# Patient Record
Sex: MALE | Race: WHITE
[De-identification: names, ages, dates, MRNs, and addresses within clinical notes are randomized per-mention and may not be internally consistent; named-entity substitution may affect disease eponyms.]

---

## 2017-08-05 ENCOUNTER — HOSPITAL ENCOUNTER (OUTPATIENT)
Dept: HOSPITAL 76 - RT | Age: 67
Discharge: HOME | End: 2017-08-05
Attending: FAMILY MEDICINE
Payer: MEDICARE

## 2017-08-05 DIAGNOSIS — J44.9: ICD-10-CM

## 2017-08-05 DIAGNOSIS — J45.909: Primary | ICD-10-CM

## 2017-08-05 PROCEDURE — 94729 DIFFUSING CAPACITY: CPT

## 2017-08-05 PROCEDURE — 94060 EVALUATION OF WHEEZING: CPT

## 2018-04-28 ENCOUNTER — HOSPITAL ENCOUNTER (OUTPATIENT)
Dept: HOSPITAL 76 - EMS | Age: 68
Discharge: ANOTHER HOSPITAL | End: 2018-04-28
Attending: SURGERY
Payer: MEDICARE

## 2018-04-28 ENCOUNTER — HOSPITAL ENCOUNTER (EMERGENCY)
Dept: HOSPITAL 76 - ED | Age: 68
Discharge: ANOTHER HOSPITAL | End: 2018-04-28
Payer: MEDICARE

## 2018-04-28 VITALS — DIASTOLIC BLOOD PRESSURE: 73 MMHG | SYSTOLIC BLOOD PRESSURE: 114 MMHG

## 2018-04-28 DIAGNOSIS — I10: ICD-10-CM

## 2018-04-28 DIAGNOSIS — K29.00: ICD-10-CM

## 2018-04-28 DIAGNOSIS — I21.4: Primary | ICD-10-CM

## 2018-04-28 DIAGNOSIS — R07.9: Primary | ICD-10-CM

## 2018-04-28 DIAGNOSIS — R07.2: ICD-10-CM

## 2018-04-28 DIAGNOSIS — J45.909: ICD-10-CM

## 2018-04-28 LAB
ALBUMIN DIAFP-MCNC: 4.7 G/DL (ref 3.2–5.5)
ALBUMIN/GLOB SERPL: 1.2 {RATIO} (ref 1–2.2)
ALP SERPL-CCNC: 102 IU/L (ref 42–121)
ALT SERPL W P-5'-P-CCNC: 34 IU/L (ref 10–60)
ANION GAP SERPL CALCULATED.4IONS-SCNC: 12 MMOL/L (ref 6–13)
AST SERPL W P-5'-P-CCNC: 44 IU/L (ref 10–42)
BASOPHILS NFR BLD AUTO: 0.2 10^3/UL (ref 0–0.1)
BASOPHILS NFR BLD AUTO: 2.5 %
BILIRUB BLD-MCNC: 1.6 MG/DL (ref 0.2–1)
BUN SERPL-MCNC: 9 MG/DL (ref 6–20)
CALCIUM UR-MCNC: 9.6 MG/DL (ref 8.5–10.3)
CHLORIDE SERPL-SCNC: 86 MMOL/L (ref 101–111)
CO2 SERPL-SCNC: 31 MMOL/L (ref 21–32)
CREAT SERPLBLD-SCNC: 0.9 MG/DL (ref 0.6–1.2)
EOSINOPHIL # BLD AUTO: 0 10^3/UL (ref 0–0.7)
EOSINOPHIL NFR BLD AUTO: 0.3 %
ERYTHROCYTE [DISTWIDTH] IN BLOOD BY AUTOMATED COUNT: 15.2 % (ref 12–15)
GFRSERPLBLD MDRD-ARVRAT: 84 ML/MIN/{1.73_M2} (ref 89–?)
GLOBULIN SER-MCNC: 3.8 G/DL (ref 2.1–4.2)
GLUCOSE SERPL-MCNC: 109 MG/DL (ref 70–100)
HGB UR QL STRIP: 16.9 G/DL (ref 14–18)
LIPASE SERPL-CCNC: 24 U/L (ref 22–51)
LYMPHOCYTES # SPEC AUTO: 1.2 10^3/UL (ref 1.5–3.5)
LYMPHOCYTES NFR BLD AUTO: 11.8 %
MAGNESIUM SERPL-MCNC: 2.1 MG/DL (ref 1.7–2.8)
MCH RBC QN AUTO: 37.5 PG (ref 27–31)
MCHC RBC AUTO-ENTMCNC: 34.5 G/DL (ref 32–36)
MCV RBC AUTO: 108.7 FL (ref 80–94)
MONOCYTES # BLD AUTO: 0.6 10^3/UL (ref 0–1)
MONOCYTES NFR BLD AUTO: 6 %
NEUTROPHILS # BLD AUTO: 7.8 10^3/UL (ref 1.5–6.6)
NEUTROPHILS # SNV AUTO: 9.8 X10^3/UL (ref 4.8–10.8)
NEUTROPHILS NFR BLD AUTO: 79.4 %
PDW BLD AUTO: 7 FL (ref 7.4–11.4)
PLATELET # BLD: 305 10^3/UL (ref 130–450)
PROT SPEC-MCNC: 8.5 G/DL (ref 6.7–8.2)
RBC MAR: 4.51 10^6/UL (ref 4.7–6.1)
SODIUM SERPLBLD-SCNC: 129 MMOL/L (ref 135–145)

## 2018-04-28 PROCEDURE — 36415 COLL VENOUS BLD VENIPUNCTURE: CPT

## 2018-04-28 PROCEDURE — 85025 COMPLETE CBC W/AUTO DIFF WBC: CPT

## 2018-04-28 PROCEDURE — 96365 THER/PROPH/DIAG IV INF INIT: CPT

## 2018-04-28 PROCEDURE — 93005 ELECTROCARDIOGRAM TRACING: CPT

## 2018-04-28 PROCEDURE — 80053 COMPREHEN METABOLIC PANEL: CPT

## 2018-04-28 PROCEDURE — 96376 TX/PRO/DX INJ SAME DRUG ADON: CPT

## 2018-04-28 PROCEDURE — 84484 ASSAY OF TROPONIN QUANT: CPT

## 2018-04-28 PROCEDURE — 83880 ASSAY OF NATRIURETIC PEPTIDE: CPT

## 2018-04-28 PROCEDURE — 83735 ASSAY OF MAGNESIUM: CPT

## 2018-04-28 PROCEDURE — 99284 EMERGENCY DEPT VISIT MOD MDM: CPT

## 2018-04-28 PROCEDURE — 80320 DRUG SCREEN QUANTALCOHOLS: CPT

## 2018-04-28 PROCEDURE — 83690 ASSAY OF LIPASE: CPT

## 2018-04-28 PROCEDURE — 96375 TX/PRO/DX INJ NEW DRUG ADDON: CPT

## 2018-04-28 PROCEDURE — 96366 THER/PROPH/DIAG IV INF ADDON: CPT

## 2018-04-28 PROCEDURE — 99285 EMERGENCY DEPT VISIT HI MDM: CPT

## 2018-04-28 PROCEDURE — 71046 X-RAY EXAM CHEST 2 VIEWS: CPT

## 2018-04-28 PROCEDURE — 96367 TX/PROPH/DG ADDL SEQ IV INF: CPT

## 2018-04-28 RX ADMIN — NITROGLYCERIN STA: 20 OINTMENT TOPICAL at 13:07

## 2018-04-28 RX ADMIN — NITROGLYCERIN STA INCH: 20 OINTMENT TOPICAL at 12:30

## 2018-06-12 ENCOUNTER — HOSPITAL ENCOUNTER (EMERGENCY)
Dept: HOSPITAL 76 - ED | Age: 68
Discharge: HOME | End: 2018-06-12
Payer: MEDICARE

## 2018-06-12 VITALS — SYSTOLIC BLOOD PRESSURE: 112 MMHG | DIASTOLIC BLOOD PRESSURE: 67 MMHG

## 2018-06-12 DIAGNOSIS — I10: ICD-10-CM

## 2018-06-12 DIAGNOSIS — I95.2: Primary | ICD-10-CM

## 2018-06-12 DIAGNOSIS — K70.9: ICD-10-CM

## 2018-06-12 DIAGNOSIS — Z79.82: ICD-10-CM

## 2018-06-12 LAB
ALBUMIN DIAFP-MCNC: 3.5 G/DL (ref 3.2–5.5)
ALBUMIN/GLOB SERPL: 1.2 {RATIO} (ref 1–2.2)
ALP SERPL-CCNC: 77 IU/L (ref 42–121)
ALT SERPL W P-5'-P-CCNC: 34 IU/L (ref 10–60)
ANION GAP SERPL CALCULATED.4IONS-SCNC: 7 MMOL/L (ref 6–13)
AST SERPL W P-5'-P-CCNC: 44 IU/L (ref 10–42)
BASOPHILS NFR BLD AUTO: 0 10^3/UL (ref 0–0.1)
BASOPHILS NFR BLD AUTO: 0.6 %
BILIRUB BLD-MCNC: 1.4 MG/DL (ref 0.2–1)
BUN SERPL-MCNC: 10 MG/DL (ref 6–20)
CALCIUM UR-MCNC: 9.1 MG/DL (ref 8.5–10.3)
CHLORIDE SERPL-SCNC: 91 MMOL/L (ref 101–111)
CO2 SERPL-SCNC: 28 MMOL/L (ref 21–32)
CREAT SERPLBLD-SCNC: 0.9 MG/DL (ref 0.6–1.2)
EOSINOPHIL # BLD AUTO: 0 10^3/UL (ref 0–0.7)
EOSINOPHIL NFR BLD AUTO: 0.6 %
ERYTHROCYTE [DISTWIDTH] IN BLOOD BY AUTOMATED COUNT: 16.1 % (ref 12–15)
GFRSERPLBLD MDRD-ARVRAT: 84 ML/MIN/{1.73_M2} (ref 89–?)
GLOBULIN SER-MCNC: 3 G/DL (ref 2.1–4.2)
GLUCOSE SERPL-MCNC: 100 MG/DL (ref 70–100)
HGB UR QL STRIP: 13.3 G/DL (ref 14–18)
LIPASE SERPL-CCNC: 31 U/L (ref 22–51)
LYMPHOCYTES # SPEC AUTO: 1.2 10^3/UL (ref 1.5–3.5)
LYMPHOCYTES NFR BLD AUTO: 16.5 %
MCH RBC QN AUTO: 38 PG (ref 27–31)
MCHC RBC AUTO-ENTMCNC: 34.3 G/DL (ref 32–36)
MCV RBC AUTO: 110.8 FL (ref 80–94)
MONOCYTES # BLD AUTO: 1.1 10^3/UL (ref 0–1)
MONOCYTES NFR BLD AUTO: 15.7 %
NEUTROPHILS # BLD AUTO: 4.9 10^3/UL (ref 1.5–6.6)
NEUTROPHILS # SNV AUTO: 7.3 X10^3/UL (ref 4.8–10.8)
NEUTROPHILS NFR BLD AUTO: 66.6 %
PDW BLD AUTO: 7.1 FL (ref 7.4–11.4)
PLAT MORPH BLD: (no result)
PLATELET # BLD: 311 10^3/UL (ref 130–450)
PLATELET BLD QL SMEAR: (no result)
PROT SPEC-MCNC: 6.5 G/DL (ref 6.7–8.2)
RBC MAR: 3.49 10^6/UL (ref 4.7–6.1)
RBC MORPH BLD: (no result)
SODIUM SERPLBLD-SCNC: 126 MMOL/L (ref 135–145)

## 2018-06-12 PROCEDURE — 99284 EMERGENCY DEPT VISIT MOD MDM: CPT

## 2018-06-12 PROCEDURE — 85025 COMPLETE CBC W/AUTO DIFF WBC: CPT

## 2018-06-12 PROCEDURE — 93005 ELECTROCARDIOGRAM TRACING: CPT

## 2018-06-12 PROCEDURE — 99283 EMERGENCY DEPT VISIT LOW MDM: CPT

## 2018-06-12 PROCEDURE — 36415 COLL VENOUS BLD VENIPUNCTURE: CPT

## 2018-06-12 PROCEDURE — 83690 ASSAY OF LIPASE: CPT

## 2018-06-12 PROCEDURE — 80053 COMPREHEN METABOLIC PANEL: CPT

## 2018-06-12 PROCEDURE — 96360 HYDRATION IV INFUSION INIT: CPT

## 2018-12-24 ENCOUNTER — HOSPITAL ENCOUNTER (EMERGENCY)
Dept: HOSPITAL 76 - ED | Age: 68
Discharge: HOME | End: 2018-12-24
Payer: MEDICARE

## 2018-12-24 VITALS — SYSTOLIC BLOOD PRESSURE: 147 MMHG | DIASTOLIC BLOOD PRESSURE: 54 MMHG

## 2018-12-24 DIAGNOSIS — L03.115: ICD-10-CM

## 2018-12-24 DIAGNOSIS — S90.421A: Primary | ICD-10-CM

## 2018-12-24 DIAGNOSIS — Z79.82: ICD-10-CM
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
## 2018-12-24 DIAGNOSIS — I10: ICD-10-CM

## 2018-12-24 DIAGNOSIS — G62.1: ICD-10-CM

## 2018-12-24 LAB
ANION GAP SERPL CALCULATED.4IONS-SCNC: 9 MMOL/L (ref 6–13)
BASOPHILS NFR BLD AUTO: 0 10^3/UL (ref 0–0.1)
BASOPHILS NFR BLD AUTO: 0.4 %
BUN SERPL-MCNC: 8 MG/DL (ref 6–20)
CALCIUM UR-MCNC: 8.8 MG/DL (ref 8.5–10.3)
CHLORIDE SERPL-SCNC: 98 MMOL/L (ref 101–111)
CO2 SERPL-SCNC: 24 MMOL/L (ref 21–32)
CREAT SERPLBLD-SCNC: 0.6 MG/DL (ref 0.6–1.2)
EOSINOPHIL # BLD AUTO: 0 10^3/UL (ref 0–0.7)
EOSINOPHIL NFR BLD AUTO: 0.3 %
ERYTHROCYTE [DISTWIDTH] IN BLOOD BY AUTOMATED COUNT: 13.6 % (ref 12–15)
GFRSERPLBLD MDRD-ARVRAT: 134 ML/MIN/{1.73_M2} (ref 89–?)
GLUCOSE SERPL-MCNC: 99 MG/DL (ref 70–100)
HGB UR QL STRIP: 14 G/DL (ref 14–18)
LYMPHOCYTES # SPEC AUTO: 1.2 10^3/UL (ref 1.5–3.5)
LYMPHOCYTES NFR BLD AUTO: 14.8 %
MCH RBC QN AUTO: 37 PG (ref 27–31)
MCHC RBC AUTO-ENTMCNC: 34.2 G/DL (ref 32–36)
MCV RBC AUTO: 108.4 FL (ref 80–94)
MONOCYTES # BLD AUTO: 1.4 10^3/UL (ref 0–1)
MONOCYTES NFR BLD AUTO: 16.5 %
NEUTROPHILS # BLD AUTO: 5.6 10^3/UL (ref 1.5–6.6)
NEUTROPHILS # SNV AUTO: 8.2 X10^3/UL (ref 4.8–10.8)
NEUTROPHILS NFR BLD AUTO: 68 %
PDW BLD AUTO: 7.5 FL (ref 7.4–11.4)
PLATELET # BLD: 274 10^3/UL (ref 130–450)
RBC MAR: 3.77 10^6/UL (ref 4.7–6.1)
SODIUM SERPLBLD-SCNC: 131 MMOL/L (ref 135–145)

## 2018-12-24 PROCEDURE — 80048 BASIC METABOLIC PNL TOTAL CA: CPT

## 2018-12-24 PROCEDURE — 36415 COLL VENOUS BLD VENIPUNCTURE: CPT

## 2018-12-24 PROCEDURE — 96374 THER/PROPH/DIAG INJ IV PUSH: CPT

## 2018-12-24 PROCEDURE — 90471 IMMUNIZATION ADMIN: CPT

## 2018-12-24 PROCEDURE — 87040 BLOOD CULTURE FOR BACTERIA: CPT

## 2018-12-24 PROCEDURE — 83605 ASSAY OF LACTIC ACID: CPT

## 2018-12-24 PROCEDURE — 85025 COMPLETE CBC W/AUTO DIFF WBC: CPT

## 2018-12-24 PROCEDURE — 99283 EMERGENCY DEPT VISIT LOW MDM: CPT

## 2019-04-22 ENCOUNTER — HOSPITAL ENCOUNTER (EMERGENCY)
Dept: HOSPITAL 76 - ED | Age: 69
Discharge: HOME | End: 2019-04-22
Payer: MEDICARE

## 2019-04-22 VITALS — DIASTOLIC BLOOD PRESSURE: 83 MMHG | SYSTOLIC BLOOD PRESSURE: 144 MMHG
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
## 2019-04-22 DIAGNOSIS — Z79.82: ICD-10-CM

## 2019-04-22 DIAGNOSIS — S72.111A: Primary | ICD-10-CM
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
## 2019-04-22 DIAGNOSIS — I10: ICD-10-CM

## 2019-04-22 PROCEDURE — 96372 THER/PROPH/DIAG INJ SC/IM: CPT

## 2019-04-22 PROCEDURE — 99283 EMERGENCY DEPT VISIT LOW MDM: CPT

## 2019-04-26 ENCOUNTER — HOSPITAL ENCOUNTER (INPATIENT)
Dept: HOSPITAL 76 - ED | Age: 69
LOS: 6 days | Discharge: ANOTHER HOSPITAL | DRG: 535 | End: 2019-05-02
Attending: HOSPITALIST | Admitting: INTERNAL MEDICINE
Payer: MEDICARE

## 2019-04-26 ENCOUNTER — HOSPITAL ENCOUNTER (OUTPATIENT)
Dept: HOSPITAL 76 - EMS | Age: 69
Discharge: ANOTHER HOSPITAL | End: 2019-04-26
Attending: SURGERY
Payer: MEDICARE

## 2019-04-26 DIAGNOSIS — D50.9: ICD-10-CM

## 2019-04-26 DIAGNOSIS — D72.829: ICD-10-CM

## 2019-04-26 DIAGNOSIS — Z91.030: ICD-10-CM

## 2019-04-26 DIAGNOSIS — J45.909: ICD-10-CM

## 2019-04-26 DIAGNOSIS — Z79.82: ICD-10-CM

## 2019-04-26 DIAGNOSIS — T46.6X6A: ICD-10-CM

## 2019-04-26 DIAGNOSIS — E53.8: ICD-10-CM
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
## 2019-04-26 DIAGNOSIS — D62: ICD-10-CM

## 2019-04-26 DIAGNOSIS — F10.239: ICD-10-CM
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
## 2019-04-26 DIAGNOSIS — E80.6: ICD-10-CM

## 2019-04-26 DIAGNOSIS — E87.6: ICD-10-CM

## 2019-04-26 DIAGNOSIS — S72.111D: ICD-10-CM

## 2019-04-26 DIAGNOSIS — B95.7: ICD-10-CM

## 2019-04-26 DIAGNOSIS — I25.10: ICD-10-CM

## 2019-04-26 DIAGNOSIS — I95.9: ICD-10-CM

## 2019-04-26 DIAGNOSIS — Z78.1: ICD-10-CM

## 2019-04-26 DIAGNOSIS — N39.0: ICD-10-CM

## 2019-04-26 DIAGNOSIS — R34: ICD-10-CM
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
## 2019-04-26 DIAGNOSIS — M25.551: Primary | ICD-10-CM

## 2019-04-26 DIAGNOSIS — G89.29: ICD-10-CM

## 2019-04-26 DIAGNOSIS — T46.4X6A: ICD-10-CM

## 2019-04-26 DIAGNOSIS — I10: ICD-10-CM

## 2019-04-26 DIAGNOSIS — S09.90XA: ICD-10-CM

## 2019-04-26 DIAGNOSIS — J98.11: ICD-10-CM

## 2019-04-26 DIAGNOSIS — F10.231: ICD-10-CM

## 2019-04-26 DIAGNOSIS — G93.41: ICD-10-CM

## 2019-04-26 DIAGNOSIS — E78.5: ICD-10-CM

## 2019-04-26 DIAGNOSIS — R41.82: ICD-10-CM

## 2019-04-26 DIAGNOSIS — M54.9: ICD-10-CM

## 2019-04-26 DIAGNOSIS — T44.7X6A: ICD-10-CM

## 2019-04-26 DIAGNOSIS — E87.1: ICD-10-CM

## 2019-04-26 DIAGNOSIS — I25.2: ICD-10-CM

## 2019-04-26 DIAGNOSIS — J90: ICD-10-CM

## 2019-04-26 DIAGNOSIS — E87.2: ICD-10-CM

## 2019-04-26 DIAGNOSIS — S72.142A: Primary | ICD-10-CM

## 2019-04-26 LAB
ALBUMIN DIAFP-MCNC: 3.6 G/DL (ref 3.2–5.5)
ALBUMIN/GLOB SERPL: 1.3 {RATIO} (ref 1–2.2)
ALP SERPL-CCNC: 100 IU/L (ref 42–121)
ALT SERPL W P-5'-P-CCNC: 21 IU/L (ref 10–60)
ANION GAP SERPL CALCULATED.4IONS-SCNC: 12 MMOL/L (ref 6–13)
ARTERIAL PATENCY WRIST A: POSITIVE
AST SERPL W P-5'-P-CCNC: 34 IU/L (ref 10–42)
BASE EXCESS BLDMV CALC-SCNC: -2 MMOL/L (ref -2–3)
BASOPHILS NFR BLD AUTO: 0.1 10^3/UL (ref 0–0.1)
BASOPHILS NFR BLD AUTO: 0.6 %
BILIRUB BLD-MCNC: 1.7 MG/DL (ref 0.2–1)
BILIRUB UR QL CFM: NEGATIVE
BUN SERPL-MCNC: 12 MG/DL (ref 6–20)
CALCIUM UR-MCNC: 9 MG/DL (ref 8.5–10.3)
CHLORIDE SERPL-SCNC: 98 MMOL/L (ref 101–111)
CLARITY UR REFRACT.AUTO: CLEAR
CO2 BLDA CALC-SCNC: 21.3 MMOL/L (ref 21–29)
CO2 SERPL-SCNC: 25 MMOL/L (ref 21–32)
CREAT SERPLBLD-SCNC: 0.7 MG/DL (ref 0.6–1.2)
DEPRECATED HCO3 PLAS-SCNC: 20.5 MMOL/L (ref 22–26)
EOSINOPHIL # BLD AUTO: 0 10^3/UL (ref 0–0.7)
EOSINOPHIL NFR BLD AUTO: 0.3 %
ERYTHROCYTE [DISTWIDTH] IN BLOOD BY AUTOMATED COUNT: 17.3 % (ref 12–15)
FIO2: 35
FOLATE SERPL-MCNC: 4.03 NG/ML
GFRSERPLBLD MDRD-ARVRAT: 112 ML/MIN/{1.73_M2} (ref 89–?)
GLOBULIN SER-MCNC: 2.8 G/DL (ref 2.1–4.2)
GLUCOSE SERPL-MCNC: 119 MG/DL (ref 70–100)
GLUCOSE UR QL STRIP.AUTO: NEGATIVE MG/DL
HGB UR QL STRIP: 11.2 G/DL (ref 14–18)
INR PPP: 1.2 (ref 0.8–1.2)
KETONES UR QL STRIP.AUTO: (no result) MG/DL
LIPASE SERPL-CCNC: 25 U/L (ref 22–51)
LYMPHOCYTES # SPEC AUTO: 1.3 10^3/UL (ref 1.5–3.5)
LYMPHOCYTES NFR BLD AUTO: 10.5 %
MCH RBC QN AUTO: 35.4 PG (ref 27–31)
MCHC RBC AUTO-ENTMCNC: 33 G/DL (ref 32–36)
MCV RBC AUTO: 107.2 FL (ref 80–94)
MONOCYTES # BLD AUTO: 1.2 10^3/UL (ref 0–1)
MONOCYTES NFR BLD AUTO: 9.7 %
NEUTROPHILS # BLD AUTO: 9.6 10^3/UL (ref 1.5–6.6)
NEUTROPHILS # SNV AUTO: 12.2 X10^3/UL (ref 4.8–10.8)
NEUTROPHILS NFR BLD AUTO: 78.9 %
NITRITE UR QL STRIP.AUTO: POSITIVE
PCO2 TEMP ADJ BLDCOA: 28 MMHG (ref 34–45)
PDW BLD AUTO: 6.8 FL (ref 7.4–11.4)
PH TEMP ADJ BLDA: 7.49 [PH] (ref 7.35–7.45)
PH UR STRIP.AUTO: 5.5 PH (ref 5–7.5)
PLATELET # BLD: 312 10^3/UL (ref 130–450)
PO2 TEMP ADJ BLDCOA: 136 MMHG (ref 80–100)
PROT SPEC-MCNC: 6.4 G/DL (ref 6.7–8.2)
PROT UR STRIP.AUTO-MCNC: NEGATIVE MG/DL
PROTHROM ACT/NOR PPP: 13.3 SECS (ref 9.9–12.6)
RBC # UR STRIP.AUTO: (no result) /UL
RBC MAR: 3.18 10^6/UL (ref 4.7–6.1)
SAO2 % BLDA FROM PO2: 98 % (ref 94–98)
SODIUM SERPLBLD-SCNC: 135 MMOL/L (ref 135–145)
SP GR UR STRIP.AUTO: 1.02 (ref 1–1.03)
SQUAMOUS URNS QL MICRO: (no result)
UROBILINOGEN UR QL STRIP.AUTO: (no result) E.U./DL
UROBILINOGEN UR STRIP.AUTO-MCNC: NEGATIVE MG/DL
VIT B12 SERPL-MCNC: 236 PG/ML (ref 180–914)

## 2019-04-26 PROCEDURE — 94002 VENT MGMT INPAT INIT DAY: CPT

## 2019-04-26 PROCEDURE — 86901 BLOOD TYPING SEROLOGIC RH(D): CPT

## 2019-04-26 PROCEDURE — 82803 BLOOD GASES ANY COMBINATION: CPT

## 2019-04-26 PROCEDURE — 87040 BLOOD CULTURE FOR BACTERIA: CPT

## 2019-04-26 PROCEDURE — 94003 VENT MGMT INPAT SUBQ DAY: CPT

## 2019-04-26 PROCEDURE — 96375 TX/PRO/DX INJ NEW DRUG ADDON: CPT

## 2019-04-26 PROCEDURE — 76770 US EXAM ABDO BACK WALL COMP: CPT

## 2019-04-26 PROCEDURE — 83690 ASSAY OF LIPASE: CPT

## 2019-04-26 PROCEDURE — 31500 INSERT EMERGENCY AIRWAY: CPT

## 2019-04-26 PROCEDURE — 80320 DRUG SCREEN QUANTALCOHOLS: CPT

## 2019-04-26 PROCEDURE — 82140 ASSAY OF AMMONIA: CPT

## 2019-04-26 PROCEDURE — 82330 ASSAY OF CALCIUM: CPT

## 2019-04-26 PROCEDURE — 84484 ASSAY OF TROPONIN QUANT: CPT

## 2019-04-26 PROCEDURE — 74176 CT ABD & PELVIS W/O CONTRAST: CPT

## 2019-04-26 PROCEDURE — 73522 X-RAY EXAM HIPS BI 3-4 VIEWS: CPT

## 2019-04-26 PROCEDURE — 36600 WITHDRAWAL OF ARTERIAL BLOOD: CPT

## 2019-04-26 PROCEDURE — 73552 X-RAY EXAM OF FEMUR 2/>: CPT

## 2019-04-26 PROCEDURE — 83540 ASSAY OF IRON: CPT

## 2019-04-26 PROCEDURE — 93005 ELECTROCARDIOGRAM TRACING: CPT

## 2019-04-26 PROCEDURE — 85610 PROTHROMBIN TIME: CPT

## 2019-04-26 PROCEDURE — 86880 COOMBS TEST DIRECT: CPT

## 2019-04-26 PROCEDURE — 70450 CT HEAD/BRAIN W/O DYE: CPT

## 2019-04-26 PROCEDURE — 82550 ASSAY OF CK (CPK): CPT

## 2019-04-26 PROCEDURE — 83880 ASSAY OF NATRIURETIC PEPTIDE: CPT

## 2019-04-26 PROCEDURE — 83605 ASSAY OF LACTIC ACID: CPT

## 2019-04-26 PROCEDURE — 99284 EMERGENCY DEPT VISIT MOD MDM: CPT

## 2019-04-26 PROCEDURE — 0BH17EZ INSERTION OF ENDOTRACHEAL AIRWAY INTO TRACHEA, VIA NATURAL OR ARTIFICIAL OPENING: ICD-10-PCS | Performed by: EMERGENCY MEDICINE

## 2019-04-26 PROCEDURE — 84134 ASSAY OF PREALBUMIN: CPT

## 2019-04-26 PROCEDURE — 82607 VITAMIN B-12: CPT

## 2019-04-26 PROCEDURE — 5A1955Z RESPIRATORY VENTILATION, GREATER THAN 96 CONSECUTIVE HOURS: ICD-10-PCS | Performed by: EMERGENCY MEDICINE

## 2019-04-26 PROCEDURE — 81001 URINALYSIS AUTO W/SCOPE: CPT

## 2019-04-26 PROCEDURE — 36415 COLL VENOUS BLD VENIPUNCTURE: CPT

## 2019-04-26 PROCEDURE — 94770: CPT

## 2019-04-26 PROCEDURE — 96376 TX/PRO/DX INJ SAME DRUG ADON: CPT

## 2019-04-26 PROCEDURE — 83615 LACTATE (LD) (LDH) ENZYME: CPT

## 2019-04-26 PROCEDURE — 83010 ASSAY OF HAPTOGLOBIN QUANT: CPT

## 2019-04-26 PROCEDURE — 82272 OCCULT BLD FECES 1-3 TESTS: CPT

## 2019-04-26 PROCEDURE — 72125 CT NECK SPINE W/O DYE: CPT

## 2019-04-26 PROCEDURE — 87150 DNA/RNA AMPLIFIED PROBE: CPT

## 2019-04-26 PROCEDURE — 82009 KETONE BODYS QUAL: CPT

## 2019-04-26 PROCEDURE — 71045 X-RAY EXAM CHEST 1 VIEW: CPT

## 2019-04-26 PROCEDURE — 80053 COMPREHEN METABOLIC PANEL: CPT

## 2019-04-26 PROCEDURE — 85025 COMPLETE CBC W/AUTO DIFF WBC: CPT

## 2019-04-26 PROCEDURE — 82746 ASSAY OF FOLIC ACID SERUM: CPT

## 2019-04-26 PROCEDURE — 86850 RBC ANTIBODY SCREEN: CPT

## 2019-04-26 PROCEDURE — 87086 URINE CULTURE/COLONY COUNT: CPT

## 2019-04-26 PROCEDURE — 71250 CT THORAX DX C-: CPT

## 2019-04-26 PROCEDURE — 96374 THER/PROPH/DIAG INJ IV PUSH: CPT

## 2019-04-26 PROCEDURE — 80202 ASSAY OF VANCOMYCIN: CPT

## 2019-04-26 PROCEDURE — 81003 URINALYSIS AUTO W/O SCOPE: CPT

## 2019-04-26 PROCEDURE — 93306 TTE W/DOPPLER COMPLETE: CPT

## 2019-04-26 PROCEDURE — 86920 COMPATIBILITY TEST SPIN: CPT

## 2019-04-26 PROCEDURE — 99291 CRITICAL CARE FIRST HOUR: CPT

## 2019-04-26 PROCEDURE — 86900 BLOOD TYPING SEROLOGIC ABO: CPT

## 2019-04-26 PROCEDURE — 83735 ASSAY OF MAGNESIUM: CPT

## 2019-04-26 PROCEDURE — 51703 INSERT BLADDER CATH COMPLEX: CPT

## 2019-04-26 PROCEDURE — 80048 BASIC METABOLIC PNL TOTAL CA: CPT

## 2019-04-26 PROCEDURE — 84466 ASSAY OF TRANSFERRIN: CPT

## 2019-04-26 PROCEDURE — 84100 ASSAY OF PHOSPHORUS: CPT

## 2019-04-26 RX ADMIN — PROPOFOL SCH MLS/HR: 10 INJECTION, EMULSION INTRAVENOUS at 21:30

## 2019-04-26 RX ADMIN — SODIUM CHLORIDE, PRESERVATIVE FREE SCH ML: 5 INJECTION INTRAVENOUS at 22:04

## 2019-04-27 LAB
ANION GAP SERPL CALCULATED.4IONS-SCNC: 12 MMOL/L (ref 6–13)
ARTERIAL PATENCY WRIST A: POSITIVE
BASE EXCESS BLDMV CALC-SCNC: -4.3 MMOL/L (ref -2–3)
BASOPHILS NFR BLD AUTO: 0.1 10^3/UL (ref 0–0.1)
BASOPHILS NFR BLD AUTO: 0.9 %
BUN SERPL-MCNC: 14 MG/DL (ref 6–20)
CALCIUM UR-MCNC: 8.3 MG/DL (ref 8.5–10.3)
CHLORIDE SERPL-SCNC: 99 MMOL/L (ref 101–111)
CO2 BLDA CALC-SCNC: 21.3 MMOL/L (ref 21–29)
CO2 SERPL-SCNC: 22 MMOL/L (ref 21–32)
CREAT SERPLBLD-SCNC: 0.7 MG/DL (ref 0.6–1.2)
DEPRECATED HCO3 PLAS-SCNC: 20.3 MMOL/L (ref 22–26)
EOSINOPHIL # BLD AUTO: 0.1 10^3/UL (ref 0–0.7)
EOSINOPHIL NFR BLD AUTO: 0.7 %
ERYTHROCYTE [DISTWIDTH] IN BLOOD BY AUTOMATED COUNT: 17.3 % (ref 12–15)
FIO2: 21
GFRSERPLBLD MDRD-ARVRAT: 112 ML/MIN/{1.73_M2} (ref 89–?)
GLUCOSE SERPL-MCNC: 95 MG/DL (ref 70–100)
HGB UR QL STRIP: 9.3 G/DL (ref 14–18)
LYMPHOCYTES # SPEC AUTO: 1.5 10^3/UL (ref 1.5–3.5)
LYMPHOCYTES NFR BLD AUTO: 15.3 %
MAGNESIUM SERPL-MCNC: 2.2 MG/DL (ref 1.7–2.8)
MCH RBC QN AUTO: 35.3 PG (ref 27–31)
MCHC RBC AUTO-ENTMCNC: 32.7 G/DL (ref 32–36)
MCV RBC AUTO: 107.9 FL (ref 80–94)
MONOCYTES # BLD AUTO: 1.1 10^3/UL (ref 0–1)
MONOCYTES NFR BLD AUTO: 11.3 %
NEUTROPHILS # BLD AUTO: 6.9 10^3/UL (ref 1.5–6.6)
NEUTROPHILS # SNV AUTO: 9.6 X10^3/UL (ref 4.8–10.8)
NEUTROPHILS NFR BLD AUTO: 71.8 %
PCO2 TEMP ADJ BLDCOA: 35 MMHG (ref 34–45)
PDW BLD AUTO: 7 FL (ref 7.4–11.4)
PH BLDV: 7.33 [PH] (ref 7.31–7.41)
PH TEMP ADJ BLDA: 7.38 [PH] (ref 7.35–7.45)
PHOSPHATE BLD-MCNC: 3.1 MG/DL (ref 2.5–4.6)
PLATELET # BLD: 238 10^3/UL (ref 130–450)
PO2 TEMP ADJ BLDCOA: 62 MMHG (ref 80–100)
RBC MAR: 2.64 10^6/UL (ref 4.7–6.1)
SAO2 % BLDA FROM PO2: 90 % (ref 94–98)
SODIUM SERPLBLD-SCNC: 133 MMOL/L (ref 135–145)

## 2019-04-27 PROCEDURE — 02HV33Z INSERTION OF INFUSION DEVICE INTO SUPERIOR VENA CAVA, PERCUTANEOUS APPROACH: ICD-10-PCS | Performed by: ANESTHESIOLOGY

## 2019-04-27 RX ADMIN — CHLORHEXIDINE GLUCONATE SCH ML: 1.2 SOLUTION ORAL at 14:29

## 2019-04-27 RX ADMIN — SODIUM CHLORIDE, PRESERVATIVE FREE SCH ML: 5 INJECTION INTRAVENOUS at 09:14

## 2019-04-27 RX ADMIN — HYDROMORPHONE HYDROCHLORIDE PRN MG: 1 INJECTION, SOLUTION INTRAMUSCULAR; INTRAVENOUS; SUBCUTANEOUS at 06:26

## 2019-04-27 RX ADMIN — SODIUM CHLORIDE SCH MLS/HR: 9 INJECTION, SOLUTION INTRAVENOUS at 04:59

## 2019-04-27 RX ADMIN — SODIUM CHLORIDE SCH MLS/HR: 9 INJECTION, SOLUTION INTRAVENOUS at 01:18

## 2019-04-27 RX ADMIN — SODIUM CHLORIDE, PRESERVATIVE FREE SCH ML: 5 INJECTION INTRAVENOUS at 21:24

## 2019-04-27 RX ADMIN — SODIUM CHLORIDE, PRESERVATIVE FREE PRN ML: 5 INJECTION INTRAVENOUS at 06:26

## 2019-04-27 RX ADMIN — PROPOFOL SCH MLS/HR: 10 INJECTION, EMULSION INTRAVENOUS at 19:17

## 2019-04-27 RX ADMIN — SODIUM CHLORIDE, PRESERVATIVE FREE SCH ML: 5 INJECTION INTRAVENOUS at 01:06

## 2019-04-27 RX ADMIN — KETOROLAC TROMETHAMINE SCH MG: 30 INJECTION, SOLUTION INTRAMUSCULAR at 19:00

## 2019-04-27 RX ADMIN — HYDROMORPHONE HYDROCHLORIDE PRN MG: 1 INJECTION, SOLUTION INTRAMUSCULAR; INTRAVENOUS; SUBCUTANEOUS at 04:30

## 2019-04-27 RX ADMIN — SODIUM CHLORIDE SCH MLS/HR: 9 INJECTION, SOLUTION INTRAVENOUS at 19:16

## 2019-04-27 RX ADMIN — SODIUM CHLORIDE SCH MLS/HR: 9 INJECTION, SOLUTION INTRAVENOUS at 14:29

## 2019-04-27 RX ADMIN — SODIUM CHLORIDE, PRESERVATIVE FREE PRN ML: 5 INJECTION INTRAVENOUS at 06:20

## 2019-04-27 RX ADMIN — SODIUM CHLORIDE, PRESERVATIVE FREE PRN ML: 5 INJECTION INTRAVENOUS at 21:24

## 2019-04-27 RX ADMIN — SODIUM CHLORIDE, PRESERVATIVE FREE SCH ML: 5 INJECTION INTRAVENOUS at 14:24

## 2019-04-27 RX ADMIN — SODIUM CHLORIDE SCH MLS/HR: 9 INJECTION, SOLUTION INTRAVENOUS at 12:00

## 2019-04-27 RX ADMIN — FAMOTIDINE SCH MG: 10 INJECTION INTRAVENOUS at 21:23

## 2019-04-27 RX ADMIN — SODIUM CHLORIDE, PRESERVATIVE FREE SCH: 5 INJECTION INTRAVENOUS at 14:30

## 2019-04-27 RX ADMIN — HYDROMORPHONE HYDROCHLORIDE PRN MG: 1 INJECTION, SOLUTION INTRAMUSCULAR; INTRAVENOUS; SUBCUTANEOUS at 01:06

## 2019-04-27 RX ADMIN — SODIUM CHLORIDE, PRESERVATIVE FREE SCH: 5 INJECTION INTRAVENOUS at 09:16

## 2019-04-27 RX ADMIN — Medication SCH MLS/HR: at 14:25

## 2019-04-27 RX ADMIN — KETOROLAC TROMETHAMINE SCH MG: 30 INJECTION, SOLUTION INTRAMUSCULAR at 23:56

## 2019-04-27 RX ADMIN — KETOROLAC TROMETHAMINE SCH MG: 30 INJECTION, SOLUTION INTRAMUSCULAR at 14:23

## 2019-04-27 RX ADMIN — CHLORHEXIDINE GLUCONATE SCH ML: 1.2 SOLUTION ORAL at 21:22

## 2019-04-27 RX ADMIN — PROPOFOL SCH MLS/HR: 10 INJECTION, EMULSION INTRAVENOUS at 05:10

## 2019-04-27 RX ADMIN — SODIUM CHLORIDE, PRESERVATIVE FREE SCH ML: 5 INJECTION INTRAVENOUS at 23:56

## 2019-04-28 LAB
ALBUMIN DIAFP-MCNC: 2.6 G/DL (ref 3.2–5.5)
ALBUMIN/GLOB SERPL: 1 {RATIO} (ref 1–2.2)
ALP SERPL-CCNC: 79 IU/L (ref 42–121)
ALT SERPL W P-5'-P-CCNC: 14 IU/L (ref 10–60)
ANION GAP SERPL CALCULATED.4IONS-SCNC: 7 MMOL/L (ref 6–13)
AST SERPL W P-5'-P-CCNC: 20 IU/L (ref 10–42)
BASOPHILS NFR BLD AUTO: 0 10^3/UL (ref 0–0.1)
BASOPHILS NFR BLD AUTO: 0.4 %
BILIRUB BLD-MCNC: 1.2 MG/DL (ref 0.2–1)
BUN SERPL-MCNC: 16 MG/DL (ref 6–20)
CALCIUM UR-MCNC: 7.7 MG/DL (ref 8.5–10.3)
CHLORIDE SERPL-SCNC: 107 MMOL/L (ref 101–111)
CO2 SERPL-SCNC: 21 MMOL/L (ref 21–32)
CREAT SERPLBLD-SCNC: 0.6 MG/DL (ref 0.6–1.2)
EOSINOPHIL # BLD AUTO: 0.2 10^3/UL (ref 0–0.7)
EOSINOPHIL NFR BLD AUTO: 1.7 %
ERYTHROCYTE [DISTWIDTH] IN BLOOD BY AUTOMATED COUNT: 17.2 % (ref 12–15)
GFRSERPLBLD MDRD-ARVRAT: 134 ML/MIN/{1.73_M2} (ref 89–?)
GLOBULIN SER-MCNC: 2.6 G/DL (ref 2.1–4.2)
GLUCOSE SERPL-MCNC: 104 MG/DL (ref 70–100)
HGB UR QL STRIP: 8.4 G/DL (ref 14–18)
IRON SATN MFR SERPL: 8 % (ref 20–50)
IRON SERPL-MCNC: 14 UG/DL (ref 45–182)
LYMPHOCYTES # SPEC AUTO: 1.4 10^3/UL (ref 1.5–3.5)
LYMPHOCYTES NFR BLD AUTO: 13 %
MAGNESIUM SERPL-MCNC: 1.9 MG/DL (ref 1.7–2.8)
MCH RBC QN AUTO: 35.4 PG (ref 27–31)
MCHC RBC AUTO-ENTMCNC: 33 G/DL (ref 32–36)
MCV RBC AUTO: 107.3 FL (ref 80–94)
MONOCYTES # BLD AUTO: 1.1 10^3/UL (ref 0–1)
MONOCYTES NFR BLD AUTO: 10.6 %
NEUTROPHILS # BLD AUTO: 7.9 10^3/UL (ref 1.5–6.6)
NEUTROPHILS # SNV AUTO: 10.7 X10^3/UL (ref 4.8–10.8)
NEUTROPHILS NFR BLD AUTO: 74.3 %
PDW BLD AUTO: 6.9 FL (ref 7.4–11.4)
PH BLDV: 7.37 [PH] (ref 7.31–7.41)
PHOSPHATE BLD-MCNC: 2.9 MG/DL (ref 2.5–4.6)
PLATELET # BLD: 269 10^3/UL (ref 130–450)
PROT SPEC-MCNC: 5.2 G/DL (ref 6.7–8.2)
RBC MAR: 2.37 10^6/UL (ref 4.7–6.1)
SODIUM SERPLBLD-SCNC: 135 MMOL/L (ref 135–145)
TIBC SERPL-MCNC: 181 UG/DL (ref 250–450)
TRANSFERRIN SERPL-MCNC: 129 MG/DL (ref 180–329)

## 2019-04-28 RX ADMIN — SODIUM CHLORIDE, PRESERVATIVE FREE PRN ML: 5 INJECTION INTRAVENOUS at 04:42

## 2019-04-28 RX ADMIN — Medication PRN UNIT: at 05:00

## 2019-04-28 RX ADMIN — FAMOTIDINE SCH MG: 10 INJECTION INTRAVENOUS at 08:24

## 2019-04-28 RX ADMIN — SODIUM CHLORIDE, PRESERVATIVE FREE SCH ML: 5 INJECTION INTRAVENOUS at 23:46

## 2019-04-28 RX ADMIN — SODIUM CHLORIDE, PRESERVATIVE FREE SCH: 5 INJECTION INTRAVENOUS at 01:13

## 2019-04-28 RX ADMIN — SODIUM CHLORIDE SCH MLS/HR: 9 INJECTION, SOLUTION INTRAVENOUS at 04:06

## 2019-04-28 RX ADMIN — SODIUM CHLORIDE, PRESERVATIVE FREE SCH ML: 5 INJECTION INTRAVENOUS at 23:47

## 2019-04-28 RX ADMIN — CHLORHEXIDINE GLUCONATE SCH ML: 1.2 SOLUTION ORAL at 08:24

## 2019-04-28 RX ADMIN — SODIUM CHLORIDE SCH MLS/HR: 9 INJECTION, SOLUTION INTRAVENOUS at 20:16

## 2019-04-28 RX ADMIN — KETOROLAC TROMETHAMINE SCH MG: 30 INJECTION, SOLUTION INTRAMUSCULAR at 12:18

## 2019-04-28 RX ADMIN — SODIUM CHLORIDE SCH MLS/HR: 9 INJECTION, SOLUTION INTRAVENOUS at 13:29

## 2019-04-28 RX ADMIN — PROPOFOL SCH MLS/HR: 10 INJECTION, EMULSION INTRAVENOUS at 04:59

## 2019-04-28 RX ADMIN — PROPOFOL SCH MLS/HR: 10 INJECTION, EMULSION INTRAVENOUS at 16:04

## 2019-04-28 RX ADMIN — FAMOTIDINE SCH MG: 10 INJECTION INTRAVENOUS at 21:24

## 2019-04-28 RX ADMIN — CHLORHEXIDINE GLUCONATE SCH ML: 1.2 SOLUTION ORAL at 21:24

## 2019-04-28 RX ADMIN — SODIUM CHLORIDE SCH: 9 INJECTION, SOLUTION INTRAVENOUS at 01:13

## 2019-04-28 RX ADMIN — Medication SCH MLS/HR: at 16:03

## 2019-04-28 RX ADMIN — SODIUM CHLORIDE SCH MLS/HR: 9 INJECTION, SOLUTION INTRAVENOUS at 13:00

## 2019-04-28 RX ADMIN — PROPOFOL SCH MLS/HR: 10 INJECTION, EMULSION INTRAVENOUS at 22:49

## 2019-04-28 RX ADMIN — KETOROLAC TROMETHAMINE SCH MG: 30 INJECTION, SOLUTION INTRAMUSCULAR at 23:46

## 2019-04-28 RX ADMIN — POTASSIUM CHLORIDE SCH MLS/HR: 14.9 INJECTION, SOLUTION INTRAVENOUS at 06:50

## 2019-04-28 RX ADMIN — SODIUM CHLORIDE, PRESERVATIVE FREE SCH ML: 5 INJECTION INTRAVENOUS at 08:24

## 2019-04-28 RX ADMIN — HYDROMORPHONE HYDROCHLORIDE PRN MG: 1 INJECTION, SOLUTION INTRAMUSCULAR; INTRAVENOUS; SUBCUTANEOUS at 04:42

## 2019-04-28 RX ADMIN — SODIUM CHLORIDE, PRESERVATIVE FREE PRN ML: 5 INJECTION INTRAVENOUS at 06:50

## 2019-04-28 RX ADMIN — KETOROLAC TROMETHAMINE SCH MG: 30 INJECTION, SOLUTION INTRAMUSCULAR at 06:20

## 2019-04-28 RX ADMIN — KETOROLAC TROMETHAMINE SCH MG: 30 INJECTION, SOLUTION INTRAMUSCULAR at 18:34

## 2019-04-28 RX ADMIN — SODIUM CHLORIDE SCH MLS/HR: 9 INJECTION, SOLUTION INTRAVENOUS at 08:25

## 2019-04-28 RX ADMIN — POTASSIUM CHLORIDE SCH MLS/HR: 14.9 INJECTION, SOLUTION INTRAVENOUS at 07:50

## 2019-04-28 RX ADMIN — SODIUM CHLORIDE, PRESERVATIVE FREE SCH ML: 5 INJECTION INTRAVENOUS at 16:09

## 2019-04-29 LAB
ALBUMIN DIAFP-MCNC: 2.2 G/DL (ref 3.2–5.5)
ALBUMIN/GLOB SERPL: 0.9 {RATIO} (ref 1–2.2)
ALP SERPL-CCNC: 69 IU/L (ref 42–121)
ALT SERPL W P-5'-P-CCNC: 14 IU/L (ref 10–60)
ANION GAP SERPL CALCULATED.4IONS-SCNC: 7 MMOL/L (ref 6–13)
AST SERPL W P-5'-P-CCNC: 22 IU/L (ref 10–42)
BASOPHILS NFR BLD AUTO: 0.1 10^3/UL (ref 0–0.1)
BASOPHILS NFR BLD AUTO: 0.8 %
BILIRUB BLD-MCNC: 1.4 MG/DL (ref 0.2–1)
BUN SERPL-MCNC: 11 MG/DL (ref 6–20)
CALCIUM UR-MCNC: 7.6 MG/DL (ref 8.5–10.3)
CHLORIDE SERPL-SCNC: 111 MMOL/L (ref 101–111)
CO2 SERPL-SCNC: 18 MMOL/L (ref 21–32)
CREAT SERPLBLD-SCNC: 0.4 MG/DL (ref 0.6–1.2)
EOSINOPHIL # BLD AUTO: 0.2 10^3/UL (ref 0–0.7)
EOSINOPHIL NFR BLD AUTO: 2.6 %
ERYTHROCYTE [DISTWIDTH] IN BLOOD BY AUTOMATED COUNT: 17.6 % (ref 12–15)
GFRSERPLBLD MDRD-ARVRAT: 214 ML/MIN/{1.73_M2} (ref 89–?)
GLOBULIN SER-MCNC: 2.4 G/DL (ref 2.1–4.2)
GLUCOSE SERPL-MCNC: 95 MG/DL (ref 70–100)
HGB UR QL STRIP: 7.8 G/DL (ref 14–18)
LYMPHOCYTES # SPEC AUTO: 1.2 10^3/UL (ref 1.5–3.5)
LYMPHOCYTES NFR BLD AUTO: 15 %
MAGNESIUM SERPL-MCNC: 1.8 MG/DL (ref 1.7–2.8)
MCH RBC QN AUTO: 36.9 PG (ref 27–31)
MCHC RBC AUTO-ENTMCNC: 34.7 G/DL (ref 32–36)
MCV RBC AUTO: 106.3 FL (ref 80–94)
MONOCYTES # BLD AUTO: 0.8 10^3/UL (ref 0–1)
MONOCYTES NFR BLD AUTO: 9.2 %
NEUTROPHILS # BLD AUTO: 6 10^3/UL (ref 1.5–6.6)
NEUTROPHILS # SNV AUTO: 8.3 X10^3/UL (ref 4.8–10.8)
NEUTROPHILS NFR BLD AUTO: 72.4 %
PDW BLD AUTO: 6.9 FL (ref 7.4–11.4)
PH BLDV: 7.41 [PH] (ref 7.31–7.41)
PHOSPHATE BLD-MCNC: 2.7 MG/DL (ref 2.5–4.6)
PLATELET # BLD: 256 10^3/UL (ref 130–450)
PROT SPEC-MCNC: 4.6 G/DL (ref 6.7–8.2)
RBC MAR: 2.13 10^6/UL (ref 4.7–6.1)
SODIUM SERPLBLD-SCNC: 136 MMOL/L (ref 135–145)

## 2019-04-29 RX ADMIN — SODIUM CHLORIDE, PRESERVATIVE FREE PRN ML: 5 INJECTION INTRAVENOUS at 12:41

## 2019-04-29 RX ADMIN — POTASSIUM CHLORIDE SCH MLS/HR: 14.9 INJECTION, SOLUTION INTRAVENOUS at 10:05

## 2019-04-29 RX ADMIN — SODIUM CHLORIDE, PRESERVATIVE FREE SCH ML: 5 INJECTION INTRAVENOUS at 09:15

## 2019-04-29 RX ADMIN — KETOROLAC TROMETHAMINE SCH MG: 30 INJECTION, SOLUTION INTRAMUSCULAR at 06:04

## 2019-04-29 RX ADMIN — SODIUM CHLORIDE SCH MLS/HR: 9 INJECTION, SOLUTION INTRAVENOUS at 15:28

## 2019-04-29 RX ADMIN — SODIUM CHLORIDE, PRESERVATIVE FREE SCH ML: 5 INJECTION INTRAVENOUS at 08:51

## 2019-04-29 RX ADMIN — KETOROLAC TROMETHAMINE SCH MG: 30 INJECTION, SOLUTION INTRAMUSCULAR at 12:41

## 2019-04-29 RX ADMIN — SODIUM CHLORIDE, PRESERVATIVE FREE SCH ML: 5 INJECTION INTRAVENOUS at 21:05

## 2019-04-29 RX ADMIN — SODIUM CHLORIDE SCH MLS/HR: 9 INJECTION, SOLUTION INTRAVENOUS at 14:40

## 2019-04-29 RX ADMIN — PROPOFOL SCH MLS/HR: 10 INJECTION, EMULSION INTRAVENOUS at 06:04

## 2019-04-29 RX ADMIN — POLYETHYLENE GLYCOL 3350 SCH GM: 17 POWDER, FOR SOLUTION ORAL at 09:27

## 2019-04-29 RX ADMIN — PROPOFOL SCH MLS/HR: 10 INJECTION, EMULSION INTRAVENOUS at 13:50

## 2019-04-29 RX ADMIN — SODIUM CHLORIDE, PRESERVATIVE FREE SCH ML: 5 INJECTION INTRAVENOUS at 17:32

## 2019-04-29 RX ADMIN — SODIUM CHLORIDE SCH MLS/HR: 9 INJECTION, SOLUTION INTRAVENOUS at 09:14

## 2019-04-29 RX ADMIN — SODIUM CHLORIDE SCH MLS/HR: 9 INJECTION, SOLUTION INTRAVENOUS at 04:46

## 2019-04-29 RX ADMIN — PROPOFOL SCH MLS/HR: 10 INJECTION, EMULSION INTRAVENOUS at 21:05

## 2019-04-29 RX ADMIN — CHLORHEXIDINE GLUCONATE SCH ML: 1.2 SOLUTION ORAL at 08:50

## 2019-04-29 RX ADMIN — SODIUM CHLORIDE SCH MLS/HR: 900 INJECTION INTRAVENOUS at 15:02

## 2019-04-29 RX ADMIN — FAMOTIDINE SCH MG: 10 INJECTION INTRAVENOUS at 21:05

## 2019-04-29 RX ADMIN — KETOROLAC TROMETHAMINE SCH MG: 30 INJECTION, SOLUTION INTRAMUSCULAR at 17:32

## 2019-04-29 RX ADMIN — SODIUM CHLORIDE SCH MLS/HR: 9 INJECTION, SOLUTION INTRAVENOUS at 12:41

## 2019-04-29 RX ADMIN — POTASSIUM CHLORIDE SCH MLS/HR: 14.9 INJECTION, SOLUTION INTRAVENOUS at 09:05

## 2019-04-29 RX ADMIN — FAMOTIDINE SCH MG: 10 INJECTION INTRAVENOUS at 08:50

## 2019-04-29 RX ADMIN — SODIUM CHLORIDE SCH MLS/HR: 900 INJECTION INTRAVENOUS at 21:17

## 2019-04-29 RX ADMIN — SODIUM CHLORIDE SCH MLS/HR: 9 INJECTION, SOLUTION INTRAVENOUS at 23:57

## 2019-04-29 RX ADMIN — SODIUM CHLORIDE SCH MLS/HR: 9 INJECTION, SOLUTION INTRAVENOUS at 11:09

## 2019-04-29 RX ADMIN — CHLORHEXIDINE GLUCONATE SCH ML: 1.2 SOLUTION ORAL at 21:12

## 2019-04-30 LAB
ALBUMIN DIAFP-MCNC: 2.1 G/DL (ref 3.2–5.5)
ALBUMIN/GLOB SERPL: 0.8 {RATIO} (ref 1–2.2)
ALP SERPL-CCNC: 75 IU/L (ref 42–121)
ALT SERPL W P-5'-P-CCNC: 14 IU/L (ref 10–60)
ANION GAP SERPL CALCULATED.4IONS-SCNC: 10 MMOL/L (ref 6–13)
ARTERIAL PATENCY WRIST A: POSITIVE
AST SERPL W P-5'-P-CCNC: 19 IU/L (ref 10–42)
BASE EXCESS BLDMV CALC-SCNC: -8.9 MMOL/L (ref -2–3)
BASOPHILS NFR BLD AUTO: 0.1 10^3/UL (ref 0–0.1)
BASOPHILS NFR BLD AUTO: 0.9 %
BILIRUB BLD-MCNC: 1.4 MG/DL (ref 0.2–1)
BUN SERPL-MCNC: 8 MG/DL (ref 6–20)
CALCIUM UR-MCNC: 7.6 MG/DL (ref 8.5–10.3)
CHLORIDE SERPL-SCNC: 110 MMOL/L (ref 101–111)
CO2 BLDA CALC-SCNC: 15.2 MMOL/L (ref 21–29)
CO2 SERPL-SCNC: 16 MMOL/L (ref 21–32)
CREAT SERPLBLD-SCNC: 0.5 MG/DL (ref 0.6–1.2)
DEPRECATED HCO3 PLAS-SCNC: 14.5 MMOL/L (ref 22–26)
EOSINOPHIL # BLD AUTO: 0.3 10^3/UL (ref 0–0.7)
EOSINOPHIL NFR BLD AUTO: 4 %
ERYTHROCYTE [DISTWIDTH] IN BLOOD BY AUTOMATED COUNT: 17.6 % (ref 12–15)
FIO2: 27
GFRSERPLBLD MDRD-ARVRAT: 165 ML/MIN/{1.73_M2} (ref 89–?)
GLOBULIN SER-MCNC: 2.6 G/DL (ref 2.1–4.2)
GLUCOSE SERPL-MCNC: 103 MG/DL (ref 70–100)
HGB UR QL STRIP: 7.7 G/DL (ref 14–18)
LYMPHOCYTES # SPEC AUTO: 0.9 10^3/UL (ref 1.5–3.5)
LYMPHOCYTES NFR BLD AUTO: 13.1 %
MAGNESIUM SERPL-MCNC: 1.6 MG/DL (ref 1.7–2.8)
MCH RBC QN AUTO: 35.4 PG (ref 27–31)
MCHC RBC AUTO-ENTMCNC: 33.5 G/DL (ref 32–36)
MCV RBC AUTO: 105.7 FL (ref 80–94)
MONOCYTES # BLD AUTO: 0.6 10^3/UL (ref 0–1)
MONOCYTES NFR BLD AUTO: 9.3 %
NEUTROPHILS # BLD AUTO: 5 10^3/UL (ref 1.5–6.6)
NEUTROPHILS # SNV AUTO: 6.9 X10^3/UL (ref 4.8–10.8)
NEUTROPHILS NFR BLD AUTO: 72.7 %
PCO2 TEMP ADJ BLDCOA: 23 MMHG (ref 34–45)
PDW BLD AUTO: 6.6 FL (ref 7.4–11.4)
PH BLDV: 7.34 [PH] (ref 7.31–7.41)
PH TEMP ADJ BLDA: 7.41 [PH] (ref 7.35–7.45)
PHOSPHATE BLD-MCNC: 3.1 MG/DL (ref 2.5–4.6)
PLATELET # BLD: 264 10^3/UL (ref 130–450)
PO2 TEMP ADJ BLDCOA: 122 MMHG (ref 80–100)
PROT SPEC-MCNC: 4.7 G/DL (ref 6.7–8.2)
RBC MAR: 2.17 10^6/UL (ref 4.7–6.1)
SAO2 % BLDA FROM PO2: 98 % (ref 94–98)
SODIUM SERPLBLD-SCNC: 136 MMOL/L (ref 135–145)

## 2019-04-30 PROCEDURE — 30233N1 TRANSFUSION OF NONAUTOLOGOUS RED BLOOD CELLS INTO PERIPHERAL VEIN, PERCUTANEOUS APPROACH: ICD-10-PCS | Performed by: INTERNAL MEDICINE

## 2019-04-30 RX ADMIN — SODIUM CHLORIDE PRN MLS/HR: 9 INJECTION, SOLUTION INTRAVENOUS at 09:30

## 2019-04-30 RX ADMIN — SODIUM CHLORIDE SCH MLS/HR: 9 INJECTION, SOLUTION INTRAVENOUS at 11:43

## 2019-04-30 RX ADMIN — ATORVASTATIN CALCIUM SCH MG: 40 TABLET, FILM COATED ORAL at 20:16

## 2019-04-30 RX ADMIN — SODIUM CHLORIDE, PRESERVATIVE FREE PRN ML: 5 INJECTION INTRAVENOUS at 22:23

## 2019-04-30 RX ADMIN — SODIUM CHLORIDE, PRESERVATIVE FREE PRN ML: 5 INJECTION INTRAVENOUS at 20:16

## 2019-04-30 RX ADMIN — PROPOFOL SCH MLS/HR: 10 INJECTION, EMULSION INTRAVENOUS at 20:16

## 2019-04-30 RX ADMIN — SODIUM CHLORIDE, PRESERVATIVE FREE PRN ML: 5 INJECTION INTRAVENOUS at 15:57

## 2019-04-30 RX ADMIN — PROPOFOL SCH MLS/HR: 10 INJECTION, EMULSION INTRAVENOUS at 03:57

## 2019-04-30 RX ADMIN — HYDROMORPHONE HYDROCHLORIDE PRN MG: 1 INJECTION, SOLUTION INTRAMUSCULAR; INTRAVENOUS; SUBCUTANEOUS at 16:21

## 2019-04-30 RX ADMIN — CHLORHEXIDINE GLUCONATE SCH ML: 1.2 SOLUTION ORAL at 08:54

## 2019-04-30 RX ADMIN — SODIUM CHLORIDE SCH MLS/HR: 900 INJECTION INTRAVENOUS at 14:36

## 2019-04-30 RX ADMIN — SODIUM CHLORIDE SCH: 9 INJECTION, SOLUTION INTRAVENOUS at 00:22

## 2019-04-30 RX ADMIN — SODIUM CHLORIDE SCH MLS/HR: 9 INJECTION, SOLUTION INTRAVENOUS at 14:41

## 2019-04-30 RX ADMIN — SODIUM CHLORIDE, PRESERVATIVE FREE SCH ML: 5 INJECTION INTRAVENOUS at 16:23

## 2019-04-30 RX ADMIN — SODIUM CHLORIDE, PRESERVATIVE FREE PRN ML: 5 INJECTION INTRAVENOUS at 05:34

## 2019-04-30 RX ADMIN — POTASSIUM CHLORIDE SCH MLS/HR: 14.9 INJECTION, SOLUTION INTRAVENOUS at 05:32

## 2019-04-30 RX ADMIN — PROPOFOL SCH MLS/HR: 10 INJECTION, EMULSION INTRAVENOUS at 08:41

## 2019-04-30 RX ADMIN — SODIUM CHLORIDE, PRESERVATIVE FREE SCH ML: 5 INJECTION INTRAVENOUS at 00:02

## 2019-04-30 RX ADMIN — SODIUM CHLORIDE PRN MLS/HR: 9 INJECTION, SOLUTION INTRAVENOUS at 13:23

## 2019-04-30 RX ADMIN — SODIUM CHLORIDE SCH MLS/HR: 9 INJECTION, SOLUTION INTRAVENOUS at 14:38

## 2019-04-30 RX ADMIN — FAMOTIDINE SCH MG: 10 INJECTION INTRAVENOUS at 08:54

## 2019-04-30 RX ADMIN — SODIUM CHLORIDE SCH: 9 INJECTION, SOLUTION INTRAVENOUS at 00:20

## 2019-04-30 RX ADMIN — HYDROMORPHONE HYDROCHLORIDE PRN MG: 1 INJECTION, SOLUTION INTRAMUSCULAR; INTRAVENOUS; SUBCUTANEOUS at 22:22

## 2019-04-30 RX ADMIN — SODIUM CHLORIDE, PRESERVATIVE FREE PRN ML: 5 INJECTION INTRAVENOUS at 04:21

## 2019-04-30 RX ADMIN — SODIUM CHLORIDE, PRESERVATIVE FREE SCH ML: 5 INJECTION INTRAVENOUS at 08:55

## 2019-04-30 RX ADMIN — SODIUM CHLORIDE SCH MLS/HR: 900 INJECTION INTRAVENOUS at 05:32

## 2019-04-30 RX ADMIN — SODIUM CHLORIDE, PRESERVATIVE FREE SCH ML: 5 INJECTION INTRAVENOUS at 02:14

## 2019-04-30 RX ADMIN — POLYETHYLENE GLYCOL 3350 SCH GM: 17 POWDER, FOR SOLUTION ORAL at 09:29

## 2019-04-30 RX ADMIN — PROPOFOL SCH MLS/HR: 10 INJECTION, EMULSION INTRAVENOUS at 23:16

## 2019-04-30 RX ADMIN — SODIUM CHLORIDE, PRESERVATIVE FREE PRN ML: 5 INJECTION INTRAVENOUS at 23:11

## 2019-04-30 RX ADMIN — SODIUM CHLORIDE, PRESERVATIVE FREE SCH: 5 INJECTION INTRAVENOUS at 18:03

## 2019-04-30 RX ADMIN — POTASSIUM CHLORIDE SCH MLS/HR: 14.9 INJECTION, SOLUTION INTRAVENOUS at 06:24

## 2019-04-30 RX ADMIN — SODIUM CHLORIDE SCH MLS/HR: 9 INJECTION, SOLUTION INTRAVENOUS at 11:32

## 2019-04-30 RX ADMIN — CHLORHEXIDINE GLUCONATE SCH ML: 1.2 SOLUTION ORAL at 20:16

## 2019-04-30 RX ADMIN — PROPOFOL SCH MLS/HR: 10 INJECTION, EMULSION INTRAVENOUS at 11:33

## 2019-04-30 RX ADMIN — SODIUM CHLORIDE SCH MLS/HR: 9 INJECTION, SOLUTION INTRAVENOUS at 00:01

## 2019-04-30 RX ADMIN — FAMOTIDINE SCH MG: 10 INJECTION INTRAVENOUS at 20:16

## 2019-05-01 LAB
ALBUMIN DIAFP-MCNC: 2.1 G/DL (ref 3.2–5.5)
ALBUMIN/GLOB SERPL: 0.8 {RATIO} (ref 1–2.2)
ALP SERPL-CCNC: 83 IU/L (ref 42–121)
ALT SERPL W P-5'-P-CCNC: 15 IU/L (ref 10–60)
ANION GAP SERPL CALCULATED.4IONS-SCNC: 9 MMOL/L (ref 6–13)
ARTERIAL PATENCY WRIST A: POSITIVE
AST SERPL W P-5'-P-CCNC: 26 IU/L (ref 10–42)
BASE EXCESS BLDMV CALC-SCNC: -8.1 MMOL/L (ref -2–3)
BASOPHILS NFR BLD AUTO: 0.1 10^3/UL (ref 0–0.1)
BASOPHILS NFR BLD AUTO: 1 %
BILIRUB BLD-MCNC: 3.1 MG/DL (ref 0.2–1)
BUN SERPL-MCNC: 8 MG/DL (ref 6–20)
CALCIUM UR-MCNC: 7.5 MG/DL (ref 8.5–10.3)
CHLORIDE SERPL-SCNC: 108 MMOL/L (ref 101–111)
CK SERPL-CCNC: 34 IU/L (ref 22–269)
CLARITY UR REFRACT.AUTO: CLEAR
CO2 BLDA CALC-SCNC: 16.4 MMOL/L (ref 21–29)
CO2 SERPL-SCNC: 18 MMOL/L (ref 21–32)
CREAT SERPLBLD-SCNC: 0.5 MG/DL (ref 0.6–1.2)
DEPRECATED HCO3 PLAS-SCNC: 15.6 MMOL/L (ref 22–26)
EOSINOPHIL # BLD AUTO: 0.4 10^3/UL (ref 0–0.7)
EOSINOPHIL NFR BLD AUTO: 4.7 %
ERYTHROCYTE [DISTWIDTH] IN BLOOD BY AUTOMATED COUNT: 20.1 % (ref 12–15)
FIO2: 27
GFRSERPLBLD MDRD-ARVRAT: 165 ML/MIN/{1.73_M2} (ref 89–?)
GLOBULIN SER-MCNC: 2.7 G/DL (ref 2.1–4.2)
GLUCOSE SERPL-MCNC: 130 MG/DL (ref 70–100)
GLUCOSE UR QL STRIP.AUTO: NEGATIVE MG/DL
HGB UR QL STRIP: 10.7 G/DL (ref 14–18)
KETONES SERPL STRIP-SCNC: NEGATIVE MMOL/L
KETONES UR QL STRIP.AUTO: NEGATIVE MG/DL
LYMPHOCYTES # SPEC AUTO: 1 10^3/UL (ref 1.5–3.5)
LYMPHOCYTES NFR BLD AUTO: 11.1 %
MAGNESIUM SERPL-MCNC: 2.1 MG/DL (ref 1.7–2.8)
MCH RBC QN AUTO: 34.2 PG (ref 27–31)
MCHC RBC AUTO-ENTMCNC: 34 G/DL (ref 32–36)
MCV RBC AUTO: 100.4 FL (ref 80–94)
MONOCYTES # BLD AUTO: 0.9 10^3/UL (ref 0–1)
MONOCYTES NFR BLD AUTO: 10.1 %
NEUTROPHILS # BLD AUTO: 6.7 10^3/UL (ref 1.5–6.6)
NEUTROPHILS # SNV AUTO: 9.1 X10^3/UL (ref 4.8–10.8)
NEUTROPHILS NFR BLD AUTO: 73.1 %
NITRITE UR QL STRIP.AUTO: NEGATIVE
PCO2 TEMP ADJ BLDCOA: 27 MMHG (ref 34–45)
PDW BLD AUTO: 6.3 FL (ref 7.4–11.4)
PH TEMP ADJ BLDA: 7.38 [PH] (ref 7.35–7.45)
PH UR STRIP.AUTO: 5.5 PH (ref 5–7.5)
PHOSPHATE BLD-MCNC: 3.4 MG/DL (ref 2.5–4.6)
PLATELET # BLD: 325 10^3/UL (ref 130–450)
PO2 TEMP ADJ BLDCOA: 92 MMHG (ref 80–100)
PROT SPEC-MCNC: 4.8 G/DL (ref 6.7–8.2)
PROT UR STRIP.AUTO-MCNC: NEGATIVE MG/DL
RBC # UR STRIP.AUTO: NEGATIVE /UL
RBC # URNS HPF: (no result) /HPF (ref 0–5)
RBC MAR: 3.12 10^6/UL (ref 4.7–6.1)
SAO2 % BLDA FROM PO2: 97 % (ref 94–98)
SODIUM SERPLBLD-SCNC: 135 MMOL/L (ref 135–145)
SP GR UR STRIP.AUTO: 1.02 (ref 1–1.03)
SQUAMOUS URNS QL MICRO: (no result)
UROBILINOGEN UR QL STRIP.AUTO: (no result) E.U./DL
UROBILINOGEN UR STRIP.AUTO-MCNC: NEGATIVE MG/DL

## 2019-05-01 RX ADMIN — SODIUM CHLORIDE, PRESERVATIVE FREE SCH ML: 5 INJECTION INTRAVENOUS at 00:28

## 2019-05-01 RX ADMIN — HYDROMORPHONE HYDROCHLORIDE PRN MG: 1 INJECTION, SOLUTION INTRAMUSCULAR; INTRAVENOUS; SUBCUTANEOUS at 16:15

## 2019-05-01 RX ADMIN — HYDROMORPHONE HYDROCHLORIDE PRN MG: 1 INJECTION, SOLUTION INTRAMUSCULAR; INTRAVENOUS; SUBCUTANEOUS at 02:56

## 2019-05-01 RX ADMIN — ALBUMIN (HUMAN) SCH MLS/HR: 0.25 INJECTION, SOLUTION INTRAVENOUS at 08:39

## 2019-05-01 RX ADMIN — SODIUM CHLORIDE, PRESERVATIVE FREE SCH ML: 5 INJECTION INTRAVENOUS at 19:36

## 2019-05-01 RX ADMIN — SODIUM CHLORIDE, PRESERVATIVE FREE SCH ML: 5 INJECTION INTRAVENOUS at 08:46

## 2019-05-01 RX ADMIN — SODIUM CHLORIDE, PRESERVATIVE FREE SCH ML: 5 INJECTION INTRAVENOUS at 21:26

## 2019-05-01 RX ADMIN — SODIUM CHLORIDE, PRESERVATIVE FREE SCH ML: 5 INJECTION INTRAVENOUS at 18:03

## 2019-05-01 RX ADMIN — HYDROMORPHONE HYDROCHLORIDE PRN MG: 1 INJECTION, SOLUTION INTRAMUSCULAR; INTRAVENOUS; SUBCUTANEOUS at 09:39

## 2019-05-01 RX ADMIN — FAMOTIDINE SCH MG: 10 INJECTION INTRAVENOUS at 21:22

## 2019-05-01 RX ADMIN — POLYETHYLENE GLYCOL 3350 SCH GM: 17 POWDER, FOR SOLUTION ORAL at 08:45

## 2019-05-01 RX ADMIN — ALBUMIN (HUMAN) SCH MLS/HR: 0.25 INJECTION, SOLUTION INTRAVENOUS at 21:23

## 2019-05-01 RX ADMIN — SODIUM CHLORIDE SCH MLS/HR: 9 INJECTION, SOLUTION INTRAVENOUS at 06:51

## 2019-05-01 RX ADMIN — HYDROMORPHONE HYDROCHLORIDE PRN MG: 1 INJECTION, SOLUTION INTRAMUSCULAR; INTRAVENOUS; SUBCUTANEOUS at 19:36

## 2019-05-01 RX ADMIN — SODIUM CHLORIDE, PRESERVATIVE FREE SCH ML: 5 INJECTION INTRAVENOUS at 18:04

## 2019-05-01 RX ADMIN — SODIUM CHLORIDE PRN MLS/HR: 9 INJECTION, SOLUTION INTRAVENOUS at 16:59

## 2019-05-01 RX ADMIN — PROPOFOL SCH MLS/HR: 10 INJECTION, EMULSION INTRAVENOUS at 15:35

## 2019-05-01 RX ADMIN — SODIUM CHLORIDE, PRESERVATIVE FREE PRN ML: 5 INJECTION INTRAVENOUS at 05:36

## 2019-05-01 RX ADMIN — CHLORHEXIDINE GLUCONATE SCH ML: 1.2 SOLUTION ORAL at 08:43

## 2019-05-01 RX ADMIN — SODIUM CHLORIDE, PRESERVATIVE FREE PRN ML: 5 INJECTION INTRAVENOUS at 01:54

## 2019-05-01 RX ADMIN — CHLORHEXIDINE GLUCONATE SCH ML: 1.2 SOLUTION ORAL at 21:23

## 2019-05-01 RX ADMIN — ALBUMIN (HUMAN) SCH MLS/HR: 0.25 INJECTION, SOLUTION INTRAVENOUS at 14:10

## 2019-05-01 RX ADMIN — ATORVASTATIN CALCIUM SCH MG: 40 TABLET, FILM COATED ORAL at 08:46

## 2019-05-01 RX ADMIN — FAMOTIDINE SCH MG: 10 INJECTION INTRAVENOUS at 08:45

## 2019-05-01 RX ADMIN — SODIUM CHLORIDE, PRESERVATIVE FREE PRN ML: 5 INJECTION INTRAVENOUS at 02:57

## 2019-05-01 RX ADMIN — ASPIRIN SCH MG: 300 SUPPOSITORY RECTAL at 09:22

## 2019-05-02 VITALS — SYSTOLIC BLOOD PRESSURE: 145 MMHG | DIASTOLIC BLOOD PRESSURE: 84 MMHG

## 2019-05-02 LAB
ALBUMIN DIAFP-MCNC: 2.4 G/DL (ref 3.2–5.5)
ALBUMIN/GLOB SERPL: 1 {RATIO} (ref 1–2.2)
ALP SERPL-CCNC: 81 IU/L (ref 42–121)
ALT SERPL W P-5'-P-CCNC: 15 IU/L (ref 10–60)
ANION GAP SERPL CALCULATED.4IONS-SCNC: 8 MMOL/L (ref 6–13)
ARTERIAL PATENCY WRIST A: POSITIVE
AST SERPL W P-5'-P-CCNC: 22 IU/L (ref 10–42)
BASE EXCESS BLDMV CALC-SCNC: -7 MMOL/L (ref -2–3)
BASOPHILS NFR BLD AUTO: 0.1 10^3/UL (ref 0–0.1)
BASOPHILS NFR BLD AUTO: 0.8 %
BILIRUB BLD-MCNC: 1.7 MG/DL (ref 0.2–1)
BUN SERPL-MCNC: 7 MG/DL (ref 6–20)
CALCIUM UR-MCNC: 7.9 MG/DL (ref 8.5–10.3)
CHLORIDE SERPL-SCNC: 109 MMOL/L (ref 101–111)
CO2 BLDA CALC-SCNC: 19 MMOL/L (ref 21–29)
CO2 SERPL-SCNC: 18 MMOL/L (ref 21–32)
CREAT SERPLBLD-SCNC: 0.4 MG/DL (ref 0.6–1.2)
DEPRECATED HCO3 PLAS-SCNC: 18.3 MMOL/L (ref 22–26)
EOSINOPHIL # BLD AUTO: 0.4 10^3/UL (ref 0–0.7)
EOSINOPHIL NFR BLD AUTO: 4.9 %
ERYTHROCYTE [DISTWIDTH] IN BLOOD BY AUTOMATED COUNT: 19.6 % (ref 12–15)
FIO2: 27
GFRSERPLBLD MDRD-ARVRAT: 213 ML/MIN/{1.73_M2} (ref 89–?)
GLOBULIN SER-MCNC: 2.3 G/DL (ref 2.1–4.2)
GLUCOSE SERPL-MCNC: 99 MG/DL (ref 70–100)
HGB UR QL STRIP: 9.5 G/DL (ref 14–18)
LYMPHOCYTES # SPEC AUTO: 1.2 10^3/UL (ref 1.5–3.5)
LYMPHOCYTES NFR BLD AUTO: 15.6 %
MAGNESIUM SERPL-MCNC: 2.1 MG/DL (ref 1.7–2.8)
MCH RBC QN AUTO: 34 PG (ref 27–31)
MCHC RBC AUTO-ENTMCNC: 33.8 G/DL (ref 32–36)
MCV RBC AUTO: 100.8 FL (ref 80–94)
MONOCYTES # BLD AUTO: 1 10^3/UL (ref 0–1)
MONOCYTES NFR BLD AUTO: 12.9 %
NEUTROPHILS # BLD AUTO: 5 10^3/UL (ref 1.5–6.6)
NEUTROPHILS # SNV AUTO: 7.6 X10^3/UL (ref 4.8–10.8)
NEUTROPHILS NFR BLD AUTO: 65.8 %
PCO2 TEMP ADJ BLDCOA: 30 MMHG (ref 34–45)
PDW BLD AUTO: 6.6 FL (ref 7.4–11.4)
PH BLDV: 7.38 [PH] (ref 7.31–7.41)
PH TEMP ADJ BLDA: 7.39 [PH] (ref 7.35–7.45)
PHOSPHATE BLD-MCNC: 3.3 MG/DL (ref 2.5–4.6)
PLATELET # BLD: 301 10^3/UL (ref 130–450)
PO2 TEMP ADJ BLDCOA: 91 MMHG (ref 80–100)
PROT SPEC-MCNC: 4.7 G/DL (ref 6.7–8.2)
RBC MAR: 2.78 10^6/UL (ref 4.7–6.1)
SAO2 % BLDA FROM PO2: 97 % (ref 94–98)
SODIUM SERPLBLD-SCNC: 135 MMOL/L (ref 135–145)

## 2019-05-02 RX ADMIN — Medication PRN UNIT: at 03:23

## 2019-05-02 RX ADMIN — CHLORHEXIDINE GLUCONATE SCH ML: 1.2 SOLUTION ORAL at 08:52

## 2019-05-02 RX ADMIN — SODIUM CHLORIDE, PRESERVATIVE FREE PRN ML: 5 INJECTION INTRAVENOUS at 05:56

## 2019-05-02 RX ADMIN — SODIUM CHLORIDE, PRESERVATIVE FREE SCH ML: 5 INJECTION INTRAVENOUS at 08:51

## 2019-05-02 RX ADMIN — HEPARIN SODIUM SCH UNIT: 5000 INJECTION, SOLUTION INTRAVENOUS; SUBCUTANEOUS at 16:01

## 2019-05-02 RX ADMIN — HYDROMORPHONE HYDROCHLORIDE PRN MG: 1 INJECTION, SOLUTION INTRAMUSCULAR; INTRAVENOUS; SUBCUTANEOUS at 03:23

## 2019-05-02 RX ADMIN — HYDROMORPHONE HYDROCHLORIDE PRN MG: 1 INJECTION, SOLUTION INTRAMUSCULAR; INTRAVENOUS; SUBCUTANEOUS at 14:15

## 2019-05-02 RX ADMIN — HYDROMORPHONE HYDROCHLORIDE PRN MG: 1 INJECTION, SOLUTION INTRAMUSCULAR; INTRAVENOUS; SUBCUTANEOUS at 05:56

## 2019-05-02 RX ADMIN — SODIUM CHLORIDE, PRESERVATIVE FREE PRN ML: 5 INJECTION INTRAVENOUS at 05:30

## 2019-05-02 RX ADMIN — HEPARIN SODIUM SCH UNIT: 5000 INJECTION, SOLUTION INTRAVENOUS; SUBCUTANEOUS at 09:01

## 2019-05-02 RX ADMIN — SODIUM CHLORIDE, PRESERVATIVE FREE PRN ML: 5 INJECTION INTRAVENOUS at 03:23

## 2019-05-02 RX ADMIN — HYDROMORPHONE HYDROCHLORIDE PRN MG: 1 INJECTION, SOLUTION INTRAMUSCULAR; INTRAVENOUS; SUBCUTANEOUS at 09:30

## 2019-05-02 RX ADMIN — ATORVASTATIN CALCIUM SCH MG: 40 TABLET, FILM COATED ORAL at 08:43

## 2019-05-02 RX ADMIN — ASPIRIN SCH MG: 300 SUPPOSITORY RECTAL at 10:54

## 2019-05-02 RX ADMIN — ALBUMIN (HUMAN) SCH MLS/HR: 0.25 INJECTION, SOLUTION INTRAVENOUS at 05:30

## 2019-05-02 RX ADMIN — FAMOTIDINE SCH MG: 10 INJECTION INTRAVENOUS at 08:38

## 2019-05-02 RX ADMIN — POLYETHYLENE GLYCOL 3350 SCH: 17 POWDER, FOR SOLUTION ORAL at 08:51

## 2019-05-02 RX ADMIN — PROPOFOL SCH MLS/HR: 10 INJECTION, EMULSION INTRAVENOUS at 03:07

## 2019-05-02 RX ADMIN — ALBUMIN (HUMAN) SCH MLS/HR: 0.25 INJECTION, SOLUTION INTRAVENOUS at 13:46

## 2019-05-02 RX ADMIN — SODIUM CHLORIDE, PRESERVATIVE FREE PRN ML: 5 INJECTION INTRAVENOUS at 02:59

## 2020-08-27 ENCOUNTER — HOSPITAL ENCOUNTER (EMERGENCY)
Age: 70
Discharge: HOME | End: 2020-08-27
Payer: OTHER GOVERNMENT

## 2020-08-27 VITALS — OXYGEN SATURATION: 96 % | HEART RATE: 61 BPM | RESPIRATION RATE: 23 BRPM

## 2020-08-27 VITALS — SYSTOLIC BLOOD PRESSURE: 128 MMHG | OXYGEN SATURATION: 96 % | DIASTOLIC BLOOD PRESSURE: 69 MMHG | HEART RATE: 69 BPM

## 2020-08-27 VITALS — OXYGEN SATURATION: 96 % | HEART RATE: 65 BPM

## 2020-08-27 VITALS
RESPIRATION RATE: 16 BRPM | DIASTOLIC BLOOD PRESSURE: 69 MMHG | SYSTOLIC BLOOD PRESSURE: 139 MMHG | TEMPERATURE: 97.7 F | OXYGEN SATURATION: 98 % | HEART RATE: 66 BPM

## 2020-08-27 VITALS — BODY MASS INDEX: 27.8 KG/M2

## 2020-08-27 VITALS — OXYGEN SATURATION: 97 % | RESPIRATION RATE: 18 BRPM | HEART RATE: 66 BPM

## 2020-08-27 VITALS — DIASTOLIC BLOOD PRESSURE: 65 MMHG | SYSTOLIC BLOOD PRESSURE: 116 MMHG

## 2020-08-27 DIAGNOSIS — R07.9: ICD-10-CM
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
## 2020-08-27 DIAGNOSIS — S20.212A: Primary | ICD-10-CM

## 2020-08-27 LAB
ADD MANUAL DIFF / SLIDE REVIEW: NO
ALBUMIN SERPL-MCNC: 4.3 G/DL (ref 3.5–5)
ALBUMIN/GLOB SERPL: 1.5 {RATIO} (ref 1–2.8)
ALP SERPL-CCNC: 102 U/L (ref 38–126)
ALT SERPL-CCNC: 20 IU/L (ref ?–50)
BUN SERPL-MCNC: 15 MG/DL (ref 9–20)
CALCIUM SERPL-MCNC: 9.4 MG/DL (ref 8.4–10.2)
CHLORIDE SERPL-SCNC: 104 MMOL/L (ref 98–107)
CK SERPL-CCNC: 95 U/L (ref 55–170)
CKMB % RELATIVE INDEX: (no result) % (ref 1.5–5)
CO2 SERPL-SCNC: 23 MMOL/L (ref 22–32)
ESTIMATED GLOMERULAR FILT RATE: > 60 ML/MIN (ref 60–?)
GLOBULIN SER CALC-MCNC: 2.9 G/DL (ref 1.7–4.1)
GLUCOSE SERPL-MCNC: 163 MG/DL (ref 80–110)
HEMATOCRIT: 40.1 % (ref 41–53)
HEMOGLOBIN: 13.7 G/DL (ref 13.5–17.5)
HEMOLYSIS: < 15 (ref 0–50)
LIPASE SERPL-CCNC: 48 U/L (ref 23–300)
LYMPHOCYTES # SPEC AUTO: 2100 /UL (ref 1100–4500)
MCV RBC: 90.9 FL (ref 80–100)
MEAN CORPUSCULAR HEMOGLOBIN: 31 PG (ref 26–34)
MEAN CORPUSCULAR HGB CONC: 34.1 % (ref 30–36)
PLATELET COUNT: 294 X10^3/UL (ref 150–400)
POTASSIUM SERPL-SCNC: 4.4 MMOL/L (ref 3.4–5.1)
PROT SERPL-MCNC: 7.2 G/DL (ref 6.3–8.2)
SODIUM SERPL-SCNC: 136 MMOL/L (ref 137–145)
TROPONIN I SERPL-MCNC: < 0.012 NG/ML (ref 0.01–0.03)

## 2020-08-27 PROCEDURE — 84484 ASSAY OF TROPONIN QUANT: CPT

## 2020-08-27 PROCEDURE — 83690 ASSAY OF LIPASE: CPT

## 2020-08-27 PROCEDURE — 99284 EMERGENCY DEPT VISIT MOD MDM: CPT

## 2020-08-27 PROCEDURE — 93005 ELECTROCARDIOGRAM TRACING: CPT

## 2020-08-27 PROCEDURE — 85025 COMPLETE CBC W/AUTO DIFF WBC: CPT

## 2020-08-27 PROCEDURE — 82550 ASSAY OF CK (CPK): CPT

## 2020-08-27 PROCEDURE — 36415 COLL VENOUS BLD VENIPUNCTURE: CPT

## 2020-08-27 PROCEDURE — 71101 X-RAY EXAM UNILAT RIBS/CHEST: CPT

## 2020-08-27 PROCEDURE — 80053 COMPREHEN METABOLIC PANEL: CPT

## 2020-08-27 NOTE — DI.RAD.S_ITS
PROCEDURE:  XR RIBS LT MIN 3V W CXR1V  
   
INDICATIONS:  fall pain  
   
TECHNIQUE:  2 views of the left ribs were acquired, along with a single view chest.    
   
COMPARISON:  None.  
   
FINDINGS:    
   
Surgical changes and devices:  None.    
   
Bones and chest wall:  No fractures or dislocations.  No suspicious bony lesions.    
Overlying soft tissues appear unremarkable.    
   
Lungs and pleura:  No pleural effusions or pneumothorax.  Lungs appear clear.    
   
Mediastinum:  Mediastinal contours appear normal.  Heart size is normal.    
   
IMPRESSION:  Normal for age, source of current pain after trauma symptoms is not seen.  
   
   
Dictated by: Duy Patel M.D. on 8/27/2020 at 12:13       
Approved by: Duy Patel M.D. on 8/27/2020 at 12:14

## 2020-08-27 NOTE — ED.CHESTPAIN
"HPI - Chest Pain
General
Chief Complaint: Chest Pain
Stated Complaint: Fell a week ago on chest
Time Seen by Provider: 08/27/20 11:21
Source: patient
Mode of arrival: Ambulatory
Limitations: no limitations
History of Present Illness
HPI narrative: A 70-YEAR-OLD MALE WHO PRESENTS WITH LEFT-SIDED CHEST PAIN.  He has a history of coronary artery disease however he tripped and fell on at [facility] last week.  The left side of his chest hurts every time he breathes moves twist or 
presses on it.  He says however this does feel little bit like his previous heart attack.  He had an what sounds like and an STEMI in 2018 he denies having any stent and says they just monitored him in the hospital.  He is more concerned because he 
feels like his ribs are not improving.  He denies any dizziness or lightheadedness before he fell.  He says he just tripped P
MD complaint: chest pain
Onset (ago): week(s) (1)
Exacerbating factors: palpation and movement
Related Data
Home Medications

 Medication  Instructions  Recorded  Confirmed
HOMEOPATHIC SUBSTANCE (MILK 1 cap PO QDAY #0 01/27/12 
THISTLE)   
folic acid 1 tabs PO QDAY #0 01/27/12 
ibuprofen 800 mg PO Q8HP #0 01/27/12 
lorazepam [Ativan] 1 mg PO Q8HP #0 01/27/12 
albuterol sulfate [Proventil HFA] 1 puff INH PRN #0 05/29/12 
telmisartan [Micardis] 40 mg PO QDAY #0 05/29/12 


Allergies

Allergy/AdvReac Type Severity Reaction Status Date / Time
No Known Drug Allergies Allergy   Verified 08/27/20 11:24



Review of Systems
Review of Systems
Narrative: GENERAL: Denies chills, fatigue, malaise, fever, sweats, travel
HEENT: Denies sinus pain, ear pain, sore throat, difficulty swallowing, neck pain
RESPIRATORY: Denies dyspnea, cough, wheezing, hemoptysis, sputum.
CARDIOVASCULAR:  See HPI
GASTROINTESTINAL: Denies nausea, vomiting, abdominal pain, diarrhea, constipation, melena.
: Denies dysuria, frequency, incontinence, hematuria, urinary retention, flank pain.
MUSCULOSKELETAL: Denies weakness, joint pain, or bony pain
SKIN: No rash, no erythema, no pruritus
NEUROLOGIC: Denies weakness, dizziness, headache, numbness, change in speech, confusion
PSYCHIATRIC: No concerning psychosocial issues.

12 point review of systems is negative except for those stated above and HPI


Exam
Initial Vital Signs
Initial Vital Signs: Vital Signs

Temperature  97.7 F   08/27/20 11:24
Pulse Rate  66   08/27/20 11:24
Respiratory Rate  16   08/27/20 11:24
Blood Pressure  139/69   08/27/20 11:24
Pulse Oximetry  98   08/27/20 11:24

GENERAL:  Alert pleasant male and in no acute distress.
HEENT: Head atraumatic,EOMI, pupils reactive, face symmetric, moist mucous membranes 
CARDIOVASCULAR: Regular rate and rhythm without murmurs, rubs or gallops.
RESPIRATORY: Breath sounds equal bilaterally, no wheezes rales or rhonchi.  Tender to touch left anterior ribs no contusion erythema no paradoxical movement
ABDOMEN: Soft, nontender.  Normoactive bowel sounds all 4 quadrants.  No guarding or rebound.
EXTREMITIES: Normal range of motion, no clubbing or edema.  Neurovascularly intact
NEUROLOGICAL: Alert and oriented x4.Normal gait and speech. Cranial nerves II through XII grossly intact.   
SKIN: Warm, dry, no laceration, no petechiae, no rashes or lesions.

Course
Orders
Ordered:  ED Orders

08/27/20 11:23
EKG-12 Lead Stat 

08/27/20 11:34
XR ribs LT min 3V w CXR1V Stat 

08/27/20 11:38
Complete Blood Count AUTO DIFF Stat 
Comprehensive Metabolic Panel Stat 
Lipase Stat 
Troponin & CK Cardiac Panel Stat 



Vital Signs
Vital signs:  Vital Signs - 8 hr

 08/27/20
11:24 08/27/20
11:29 08/27/20
11:30
Temperature 97.7 F  
Pulse Rate 66 65 69
Respiratory Rate 16  
Blood Pressure 139/69  128/69
Pulse Oximetry 98 96 96

 08/27/20
12:01 08/27/20
12:30 08/27/20
12:37
Temperature   
Pulse Rate 61 66 
Respiratory Rate 23 18 
Blood Pressure   116/65
Pulse Oximetry 96 97 



MDM - Chest Pain
Lab Data
Attestation: I reviewed the yasir
[id number]|2020-08-27 12:10:00|2020-08-27 12:10:00|ED_ITS|[facility]|Emergency Department|[id number]|"HPI - Chest Pain

## 2021-12-28 ENCOUNTER — HOSPITAL ENCOUNTER (OUTPATIENT)
Dept: HOSPITAL 76 - DI | Age: 71
Discharge: HOME | End: 2021-12-28
Payer: OTHER GOVERNMENT

## 2021-12-28 DIAGNOSIS — M47.817: ICD-10-CM

## 2021-12-28 DIAGNOSIS — M48.061: ICD-10-CM

## 2021-12-28 DIAGNOSIS — M51.37: ICD-10-CM

## 2021-12-28 DIAGNOSIS — M47.816: ICD-10-CM

## 2021-12-28 DIAGNOSIS — M48.07: ICD-10-CM

## 2021-12-28 DIAGNOSIS — E88.2: ICD-10-CM

## 2021-12-28 DIAGNOSIS — I67.82: Primary | ICD-10-CM

## 2021-12-28 DIAGNOSIS — M51.36: ICD-10-CM

## 2022-01-20 ENCOUNTER — HOSPITAL ENCOUNTER (OUTPATIENT)
Dept: HOSPITAL 76 - LAB.N | Age: 72
Discharge: HOME | End: 2022-01-20
Attending: PHYSICIAN ASSISTANT
Payer: OTHER GOVERNMENT

## 2022-01-20 DIAGNOSIS — U07.1: Primary | ICD-10-CM

## 2022-07-03 ENCOUNTER — HOSPITAL ENCOUNTER (EMERGENCY)
Age: 72
Discharge: HOME | End: 2022-07-03
Payer: OTHER GOVERNMENT

## 2022-07-03 VITALS
HEART RATE: 74 BPM | OXYGEN SATURATION: 94 % | RESPIRATION RATE: 18 BRPM | TEMPERATURE: 97.5 F | DIASTOLIC BLOOD PRESSURE: 82 MMHG | SYSTOLIC BLOOD PRESSURE: 144 MMHG

## 2022-07-03 VITALS — HEART RATE: 68 BPM | OXYGEN SATURATION: 94 % | SYSTOLIC BLOOD PRESSURE: 130 MMHG | DIASTOLIC BLOOD PRESSURE: 77 MMHG

## 2022-07-03 VITALS — BODY MASS INDEX: 29.2 KG/M2

## 2022-07-03 DIAGNOSIS — L60.0: Primary | ICD-10-CM

## 2022-07-03 PROCEDURE — 99281 EMR DPT VST MAYX REQ PHY/QHP: CPT

## 2022-07-25 ENCOUNTER — HOSPITAL ENCOUNTER (OUTPATIENT)
Age: 72
End: 2022-07-25
Payer: OTHER GOVERNMENT

## 2022-07-25 DIAGNOSIS — R13.10: Primary | ICD-10-CM

## 2022-07-25 PROCEDURE — 74230 X-RAY XM SWLNG FUNCJ C+: CPT

## 2022-07-25 PROCEDURE — 92611 MOTION FLUOROSCOPY/SWALLOW: CPT

## 2022-09-13 ENCOUNTER — HOSPITAL ENCOUNTER (OUTPATIENT)
Dept: HOSPITAL 73 - SP | Age: 72
LOS: 7 days | End: 2022-09-20
Payer: OTHER GOVERNMENT

## 2022-09-13 DIAGNOSIS — R13.10: Primary | ICD-10-CM

## 2022-09-13 PROCEDURE — 92523 SPEECH SOUND LANG COMPREHEN: CPT

## 2022-09-13 PROCEDURE — 92507 TX SP LANG VOICE COMM INDIV: CPT

## 2023-06-03 ENCOUNTER — HOSPITAL ENCOUNTER (EMERGENCY)
Age: 73
Discharge: HOME | End: 2023-06-03
Payer: OTHER GOVERNMENT

## 2023-06-03 VITALS
SYSTOLIC BLOOD PRESSURE: 167 MMHG | OXYGEN SATURATION: 94 % | HEART RATE: 80 BPM | DIASTOLIC BLOOD PRESSURE: 80 MMHG | RESPIRATION RATE: 16 BRPM

## 2023-06-03 VITALS
SYSTOLIC BLOOD PRESSURE: 143 MMHG | RESPIRATION RATE: 18 BRPM | DIASTOLIC BLOOD PRESSURE: 74 MMHG | TEMPERATURE: 98 F | HEART RATE: 74 BPM | OXYGEN SATURATION: 94 %

## 2023-06-03 VITALS — BODY MASS INDEX: 29.1 KG/M2

## 2023-06-03 DIAGNOSIS — L03.032: Primary | ICD-10-CM

## 2023-06-03 PROCEDURE — 73630 X-RAY EXAM OF FOOT: CPT

## 2023-06-03 PROCEDURE — 99283 EMERGENCY DEPT VISIT LOW MDM: CPT

## 2023-06-14 ENCOUNTER — HOSPITAL ENCOUNTER (OUTPATIENT)
Dept: HOSPITAL 73 - SP | Age: 73
LOS: 1 days | Discharge: HOME | End: 2023-06-15
Payer: OTHER GOVERNMENT

## 2023-06-14 DIAGNOSIS — R13.10: Primary | ICD-10-CM

## 2023-06-14 DIAGNOSIS — R49.0: ICD-10-CM

## 2023-06-14 PROCEDURE — 97129 THER IVNTJ 1ST 15 MIN: CPT

## 2023-06-14 PROCEDURE — 97130 THER IVNTJ EA ADDL 15 MIN: CPT

## 2023-06-14 PROCEDURE — 96125 COGNITIVE TEST BY HC PRO: CPT

## 2023-08-18 ENCOUNTER — HOSPITAL ENCOUNTER (EMERGENCY)
Age: 73
Discharge: HOME | End: 2023-08-18
Payer: OTHER GOVERNMENT

## 2023-08-18 VITALS
SYSTOLIC BLOOD PRESSURE: 130 MMHG | HEART RATE: 67 BPM | DIASTOLIC BLOOD PRESSURE: 80 MMHG | TEMPERATURE: 97.34 F | RESPIRATION RATE: 17 BRPM | OXYGEN SATURATION: 99 %

## 2023-08-18 VITALS — BODY MASS INDEX: 29.1 KG/M2

## 2023-08-18 DIAGNOSIS — Z79.899: Primary | ICD-10-CM

## 2023-08-18 DIAGNOSIS — R33.8: ICD-10-CM

## 2023-08-18 LAB
APPEARANCE UR: CLEAR
BILIRUBIN URINE UA: NEGATIVE
COLOR UR: YELLOW
GLUCOSE URINE UA: NEGATIVE G/DL
HGB UR QL: NEGATIVE
KETONES URINE UA: NEGATIVE
LEUKOCYTE ESTERASE URINE UA: NEGATIVE
NITRITE URINE UA: NEGATIVE
PH UR: 5.5 [PH] (ref 4.5–8)
PROTEIN URINE UA: NEGATIVE
SP GR UR: <=1.005 (ref 1–1.03)
URINE COMMENTS: (no result)
URINE COMMENTS: (no result)
UROBILINOGEN UR QL: 1 E.U./DL

## 2023-08-18 PROCEDURE — 81003 URINALYSIS AUTO W/O SCOPE: CPT

## 2023-08-18 PROCEDURE — 99283 EMERGENCY DEPT VISIT LOW MDM: CPT

## 2023-08-18 PROCEDURE — 99284 EMERGENCY DEPT VISIT MOD MDM: CPT

## 2023-08-18 PROCEDURE — 81001 URINALYSIS AUTO W/SCOPE: CPT

## 2023-08-18 PROCEDURE — 51798 US URINE CAPACITY MEASURE: CPT

## 2023-08-18 PROCEDURE — 81015 MICROSCOPIC EXAM OF URINE: CPT

## 2023-09-01 ENCOUNTER — HOSPITAL ENCOUNTER (EMERGENCY)
Age: 73
Discharge: HOME | End: 2023-09-01
Payer: OTHER GOVERNMENT

## 2023-09-01 VITALS
RESPIRATION RATE: 16 BRPM | DIASTOLIC BLOOD PRESSURE: 75 MMHG | SYSTOLIC BLOOD PRESSURE: 136 MMHG | OXYGEN SATURATION: 94 % | HEART RATE: 88 BPM | TEMPERATURE: 98.6 F

## 2023-09-01 VITALS
HEART RATE: 67 BPM | DIASTOLIC BLOOD PRESSURE: 90 MMHG | RESPIRATION RATE: 20 BRPM | SYSTOLIC BLOOD PRESSURE: 187 MMHG | OXYGEN SATURATION: 96 %

## 2023-09-01 VITALS — BODY MASS INDEX: 29.1 KG/M2

## 2023-09-01 DIAGNOSIS — T83.011A: Primary | ICD-10-CM

## 2023-09-01 LAB
APPEARANCE UR: (no result)
BILIRUBIN URINE UA: (no result)
COLOR UR: YELLOW
CULTURE INDICATED URINE: (no result)
GLUCOSE URINE UA: NEGATIVE G/DL
HGB UR QL: (no result)
KETONES URINE UA: (no result)
LEUKOCYTE ESTERASE URINE UA: (no result)
NITRITE URINE UA: NEGATIVE
PH UR: 8 [PH] (ref 4.5–8)
PROTEIN URINE UA: (no result)
SP GR UR: 1.01 (ref 1–1.03)
UROBILINOGEN UR QL: 1 E.U./DL

## 2023-09-01 PROCEDURE — 87186 SC STD MICRODIL/AGAR DIL: CPT

## 2023-09-01 PROCEDURE — 81001 URINALYSIS AUTO W/SCOPE: CPT

## 2023-09-01 PROCEDURE — 87086 URINE CULTURE/COLONY COUNT: CPT

## 2023-09-01 PROCEDURE — 99283 EMERGENCY DEPT VISIT LOW MDM: CPT

## 2023-09-01 PROCEDURE — 87077 CULTURE AEROBIC IDENTIFY: CPT

## 2023-09-01 PROCEDURE — 99284 EMERGENCY DEPT VISIT MOD MDM: CPT

## 2023-10-09 ENCOUNTER — HOSPITAL ENCOUNTER (EMERGENCY)
Age: 73
Discharge: HOME | End: 2023-10-09
Payer: OTHER GOVERNMENT

## 2023-10-09 VITALS
DIASTOLIC BLOOD PRESSURE: 77 MMHG | SYSTOLIC BLOOD PRESSURE: 166 MMHG | RESPIRATION RATE: 24 BRPM | OXYGEN SATURATION: 94 % | HEART RATE: 68 BPM

## 2023-10-09 VITALS
HEART RATE: 61 BPM | DIASTOLIC BLOOD PRESSURE: 74 MMHG | SYSTOLIC BLOOD PRESSURE: 150 MMHG | RESPIRATION RATE: 20 BRPM | OXYGEN SATURATION: 95 %

## 2023-10-09 VITALS
SYSTOLIC BLOOD PRESSURE: 148 MMHG | TEMPERATURE: 97.88 F | OXYGEN SATURATION: 91 % | DIASTOLIC BLOOD PRESSURE: 80 MMHG | RESPIRATION RATE: 20 BRPM | HEART RATE: 69 BPM

## 2023-10-09 VITALS — HEART RATE: 61 BPM | OXYGEN SATURATION: 93 % | RESPIRATION RATE: 20 BRPM

## 2023-10-09 VITALS — HEART RATE: 58 BPM | OXYGEN SATURATION: 93 % | RESPIRATION RATE: 18 BRPM

## 2023-10-09 VITALS
RESPIRATION RATE: 20 BRPM | HEART RATE: 58 BPM | SYSTOLIC BLOOD PRESSURE: 130 MMHG | DIASTOLIC BLOOD PRESSURE: 66 MMHG | OXYGEN SATURATION: 93 %

## 2023-10-09 VITALS — HEART RATE: 60 BPM | RESPIRATION RATE: 19 BRPM | OXYGEN SATURATION: 92 %

## 2023-10-09 VITALS — BODY MASS INDEX: 29.1 KG/M2

## 2023-10-09 VITALS — HEART RATE: 75 BPM | OXYGEN SATURATION: 94 %

## 2023-10-09 DIAGNOSIS — N39.0: Primary | ICD-10-CM

## 2023-10-09 LAB
ADD MANUAL DIFF / SLIDE REVIEW: NO
ALBUMIN SERPL-MCNC: 4.2 G/DL (ref 3.5–5)
ALBUMIN/GLOB SERPL: 1.6 {RATIO} (ref 1–2.8)
ALP SERPL-CCNC: 71 U/L (ref 38–126)
ALT SERPL-CCNC: 14 IU/L (ref ?–50)
APPEARANCE UR: (no result)
BILIRUBIN URINE UA: NEGATIVE
BUN SERPL-MCNC: 17 MG/DL (ref 9–20)
CALCIUM SERPL-MCNC: 9.6 MG/DL (ref 8.4–10.2)
CHLORIDE SERPL-SCNC: 104 MMOL/L (ref 98–107)
CO2 SERPL-SCNC: 21 MMOL/L (ref 22–32)
COLOR UR: YELLOW
CULTURE INDICATED URINE: (no result)
ESTIMATED GLOMERULAR FILT RATE: > 60 ML/MIN (ref 60–?)
ETHANOL SERPL-MCNC: < 10 MG/DL
GLOBULIN SER CALC-MCNC: 2.7 G/DL (ref 1.7–4.1)
GLUCOSE SERPL-MCNC: 101 MG/DL (ref 80–110)
GLUCOSE URINE UA: NEGATIVE G/DL
HEMATOCRIT: 38.3 % (ref 41–53)
HEMOGLOBIN: 13.3 G/DL (ref 13.5–17.5)
HEMOLYSIS: < 15 (ref 0–50)
HGB UR QL: (no result)
KETONES URINE UA: (no result)
LEUKOCYTE ESTERASE URINE UA: (no result)
LIPASE SERPL-CCNC: 57 U/L (ref 23–300)
LYMPHOCYTES # SPEC AUTO: 1700 /UL (ref 1100–4500)
MAGNESIUM SERPL-MCNC: 1.9 MG/DL (ref 1.6–2.3)
MCV RBC: 87.5 FL (ref 80–100)
MEAN CORPUSCULAR HEMOGLOBIN: 30.3 PG (ref 26–34)
MEAN CORPUSCULAR HGB CONC: 34.6 % (ref 30–36)
NITRITE URINE UA: POSITIVE
PH UR: 5 [PH] (ref 4.5–8)
PLATELET COUNT: 289 X10^3/UL (ref 150–400)
POTASSIUM SERPL-SCNC: 4.2 MMOL/L (ref 3.4–5.1)
PROT SERPL-MCNC: 6.9 G/DL (ref 6.3–8.2)
PROTEIN URINE UA: (no result)
SODIUM SERPL-SCNC: 137 MMOL/L (ref 137–145)
SP GR UR: 1.02 (ref 1–1.03)
UROBILINOGEN UR QL: 1 E.U./DL

## 2023-10-09 PROCEDURE — 36415 COLL VENOUS BLD VENIPUNCTURE: CPT

## 2023-10-09 PROCEDURE — 80053 COMPREHEN METABOLIC PANEL: CPT

## 2023-10-09 PROCEDURE — 87086 URINE CULTURE/COLONY COUNT: CPT

## 2023-10-09 PROCEDURE — 81001 URINALYSIS AUTO W/SCOPE: CPT

## 2023-10-09 PROCEDURE — 80320 DRUG SCREEN QUANTALCOHOLS: CPT

## 2023-10-09 PROCEDURE — 85025 COMPLETE CBC W/AUTO DIFF WBC: CPT

## 2023-10-09 PROCEDURE — 83690 ASSAY OF LIPASE: CPT

## 2023-10-09 PROCEDURE — 93005 ELECTROCARDIOGRAM TRACING: CPT

## 2023-10-09 PROCEDURE — 99284 EMERGENCY DEPT VISIT MOD MDM: CPT

## 2023-10-09 PROCEDURE — 87077 CULTURE AEROBIC IDENTIFY: CPT

## 2023-10-09 PROCEDURE — 83735 ASSAY OF MAGNESIUM: CPT

## 2023-10-09 PROCEDURE — 87186 SC STD MICRODIL/AGAR DIL: CPT

## 2023-10-11 ENCOUNTER — HOSPITAL ENCOUNTER (EMERGENCY)
Age: 73
Discharge: HOME | End: 2023-10-11
Payer: OTHER GOVERNMENT

## 2023-10-11 VITALS — OXYGEN SATURATION: 97 % | SYSTOLIC BLOOD PRESSURE: 172 MMHG | HEART RATE: 64 BPM | DIASTOLIC BLOOD PRESSURE: 79 MMHG

## 2023-10-11 VITALS
DIASTOLIC BLOOD PRESSURE: 71 MMHG | SYSTOLIC BLOOD PRESSURE: 115 MMHG | HEART RATE: 78 BPM | OXYGEN SATURATION: 95 % | TEMPERATURE: 97.52 F | RESPIRATION RATE: 18 BRPM

## 2023-10-11 VITALS — HEART RATE: 66 BPM | OXYGEN SATURATION: 96 %

## 2023-10-11 VITALS — OXYGEN SATURATION: 98 % | HEART RATE: 69 BPM

## 2023-10-11 VITALS — OXYGEN SATURATION: 96 % | DIASTOLIC BLOOD PRESSURE: 73 MMHG | SYSTOLIC BLOOD PRESSURE: 118 MMHG | HEART RATE: 68 BPM

## 2023-10-11 VITALS — DIASTOLIC BLOOD PRESSURE: 60 MMHG | OXYGEN SATURATION: 98 % | HEART RATE: 71 BPM | SYSTOLIC BLOOD PRESSURE: 112 MMHG

## 2023-10-11 VITALS — SYSTOLIC BLOOD PRESSURE: 113 MMHG | DIASTOLIC BLOOD PRESSURE: 75 MMHG | OXYGEN SATURATION: 98 % | HEART RATE: 74 BPM

## 2023-10-11 VITALS — OXYGEN SATURATION: 95 % | HEART RATE: 77 BPM

## 2023-10-11 VITALS — SYSTOLIC BLOOD PRESSURE: 111 MMHG | OXYGEN SATURATION: 97 % | DIASTOLIC BLOOD PRESSURE: 74 MMHG | HEART RATE: 76 BPM

## 2023-10-11 VITALS — DIASTOLIC BLOOD PRESSURE: 90 MMHG | OXYGEN SATURATION: 96 % | HEART RATE: 69 BPM | SYSTOLIC BLOOD PRESSURE: 141 MMHG

## 2023-10-11 VITALS — SYSTOLIC BLOOD PRESSURE: 117 MMHG | OXYGEN SATURATION: 97 % | HEART RATE: 70 BPM | DIASTOLIC BLOOD PRESSURE: 77 MMHG

## 2023-10-11 VITALS — DIASTOLIC BLOOD PRESSURE: 75 MMHG | SYSTOLIC BLOOD PRESSURE: 136 MMHG | HEART RATE: 68 BPM | OXYGEN SATURATION: 96 %

## 2023-10-11 VITALS — HEART RATE: 69 BPM | OXYGEN SATURATION: 94 %

## 2023-10-11 VITALS — HEART RATE: 67 BPM | OXYGEN SATURATION: 96 %

## 2023-10-11 VITALS — HEART RATE: 63 BPM | OXYGEN SATURATION: 97 %

## 2023-10-11 DIAGNOSIS — N39.0: Primary | ICD-10-CM

## 2023-10-11 LAB
ADD MANUAL DIFF / SLIDE REVIEW: NO
ALBUMIN SERPL-MCNC: 4 G/DL (ref 3.5–5)
ALBUMIN/GLOB SERPL: 1.5 {RATIO} (ref 1–2.8)
ALP SERPL-CCNC: 60 U/L (ref 38–126)
ALT SERPL-CCNC: 13 IU/L (ref ?–50)
BUN SERPL-MCNC: 11 MG/DL (ref 9–20)
CALCIUM SERPL-MCNC: 9.5 MG/DL (ref 8.4–10.2)
CHLORIDE SERPL-SCNC: 105 MMOL/L (ref 98–107)
CO2 SERPL-SCNC: 24 MMOL/L (ref 22–32)
ESTIMATED GLOMERULAR FILT RATE: > 60 ML/MIN (ref 60–?)
GLOBULIN SER CALC-MCNC: 2.7 G/DL (ref 1.7–4.1)
GLUCOSE SERPL-MCNC: 104 MG/DL (ref 80–110)
HEMATOCRIT: 39.7 % (ref 41–53)
HEMOGLOBIN: 13.4 G/DL (ref 13.5–17.5)
HEMOLYSIS: 39 (ref 0–50)
INR PPP: 1.2 (ref 0.9–1.3)
LACTATE SERPL-MCNC: 0.9 MMOL/L (ref 0.7–2.1)
LYMPHOCYTES # SPEC AUTO: 1800 /UL (ref 1100–4500)
MCV RBC: 88.2 FL (ref 80–100)
MEAN CORPUSCULAR HEMOGLOBIN: 29.8 PG (ref 26–34)
MEAN CORPUSCULAR HGB CONC: 33.8 % (ref 30–36)
PLATELET COUNT: 283 X10^3/UL (ref 150–400)
POTASSIUM SERPL-SCNC: 4.4 MMOL/L (ref 3.4–5.1)
PROCALCITONIN SERPL-MCNC: 0.04 NG/ML (ref ?–0.5)
PROT SERPL-MCNC: 6.7 G/DL (ref 6.3–8.2)
PROTHROMBIN TIME: 13.9 SECONDS (ref 10.1–12.7)
SODIUM SERPL-SCNC: 136 MMOL/L (ref 137–145)
TROPONIN I SERPL-MCNC: < 0.012 NG/ML (ref 0.01–0.03)

## 2023-10-11 PROCEDURE — 85610 PROTHROMBIN TIME: CPT

## 2023-10-11 PROCEDURE — 97161 PT EVAL LOW COMPLEX 20 MIN: CPT

## 2023-10-11 PROCEDURE — 84145 PROCALCITONIN (PCT): CPT

## 2023-10-11 PROCEDURE — 99284 EMERGENCY DEPT VISIT MOD MDM: CPT

## 2023-10-11 PROCEDURE — 83605 ASSAY OF LACTIC ACID: CPT

## 2023-10-11 PROCEDURE — 85025 COMPLETE CBC W/AUTO DIFF WBC: CPT

## 2023-10-11 PROCEDURE — 84484 ASSAY OF TROPONIN QUANT: CPT

## 2023-10-11 PROCEDURE — 99283 EMERGENCY DEPT VISIT LOW MDM: CPT

## 2023-10-11 PROCEDURE — 93005 ELECTROCARDIOGRAM TRACING: CPT

## 2023-10-11 PROCEDURE — 96365 THER/PROPH/DIAG IV INF INIT: CPT

## 2023-10-11 PROCEDURE — 80053 COMPREHEN METABOLIC PANEL: CPT

## 2023-10-12 ENCOUNTER — HOSPITAL ENCOUNTER (EMERGENCY)
Age: 73
Discharge: HOME | End: 2023-10-12
Payer: OTHER GOVERNMENT

## 2023-10-12 VITALS — OXYGEN SATURATION: 97 % | HEART RATE: 66 BPM | DIASTOLIC BLOOD PRESSURE: 77 MMHG | SYSTOLIC BLOOD PRESSURE: 174 MMHG

## 2023-10-12 VITALS — HEART RATE: 62 BPM | SYSTOLIC BLOOD PRESSURE: 187 MMHG | DIASTOLIC BLOOD PRESSURE: 87 MMHG | OXYGEN SATURATION: 96 %

## 2023-10-12 VITALS — SYSTOLIC BLOOD PRESSURE: 112 MMHG | OXYGEN SATURATION: 96 % | DIASTOLIC BLOOD PRESSURE: 68 MMHG | HEART RATE: 69 BPM

## 2023-10-12 VITALS — SYSTOLIC BLOOD PRESSURE: 150 MMHG | OXYGEN SATURATION: 93 % | DIASTOLIC BLOOD PRESSURE: 85 MMHG | HEART RATE: 68 BPM

## 2023-10-12 VITALS — OXYGEN SATURATION: 95 % | HEART RATE: 69 BPM | DIASTOLIC BLOOD PRESSURE: 70 MMHG | SYSTOLIC BLOOD PRESSURE: 145 MMHG

## 2023-10-12 VITALS — OXYGEN SATURATION: 97 % | HEART RATE: 63 BPM

## 2023-10-12 VITALS — HEART RATE: 73 BPM | SYSTOLIC BLOOD PRESSURE: 146 MMHG | DIASTOLIC BLOOD PRESSURE: 77 MMHG | OXYGEN SATURATION: 96 %

## 2023-10-12 VITALS — OXYGEN SATURATION: 96 % | HEART RATE: 69 BPM | DIASTOLIC BLOOD PRESSURE: 67 MMHG | SYSTOLIC BLOOD PRESSURE: 128 MMHG

## 2023-10-12 VITALS — HEART RATE: 63 BPM | OXYGEN SATURATION: 97 %

## 2023-10-12 VITALS — OXYGEN SATURATION: 92 % | HEART RATE: 71 BPM

## 2023-10-12 VITALS
DIASTOLIC BLOOD PRESSURE: 78 MMHG | SYSTOLIC BLOOD PRESSURE: 119 MMHG | OXYGEN SATURATION: 95 % | RESPIRATION RATE: 18 BRPM | TEMPERATURE: 97.6 F | HEART RATE: 72 BPM

## 2023-10-12 VITALS — OXYGEN SATURATION: 96 % | HEART RATE: 66 BPM | SYSTOLIC BLOOD PRESSURE: 185 MMHG | DIASTOLIC BLOOD PRESSURE: 88 MMHG

## 2023-10-12 VITALS — DIASTOLIC BLOOD PRESSURE: 80 MMHG | OXYGEN SATURATION: 95 % | HEART RATE: 70 BPM | SYSTOLIC BLOOD PRESSURE: 131 MMHG

## 2023-10-12 VITALS — HEART RATE: 63 BPM | SYSTOLIC BLOOD PRESSURE: 195 MMHG | DIASTOLIC BLOOD PRESSURE: 88 MMHG | OXYGEN SATURATION: 96 %

## 2023-10-12 VITALS — HEART RATE: 65 BPM | OXYGEN SATURATION: 96 % | SYSTOLIC BLOOD PRESSURE: 162 MMHG | DIASTOLIC BLOOD PRESSURE: 75 MMHG

## 2023-10-12 VITALS — BODY MASS INDEX: 26.7 KG/M2

## 2023-10-12 VITALS — OXYGEN SATURATION: 96 % | HEART RATE: 66 BPM | DIASTOLIC BLOOD PRESSURE: 80 MMHG | SYSTOLIC BLOOD PRESSURE: 142 MMHG

## 2023-10-12 VITALS — SYSTOLIC BLOOD PRESSURE: 163 MMHG | OXYGEN SATURATION: 97 % | HEART RATE: 66 BPM | DIASTOLIC BLOOD PRESSURE: 85 MMHG

## 2023-10-12 VITALS — DIASTOLIC BLOOD PRESSURE: 68 MMHG | HEART RATE: 69 BPM | SYSTOLIC BLOOD PRESSURE: 73 MMHG

## 2023-10-12 VITALS — SYSTOLIC BLOOD PRESSURE: 73 MMHG | DIASTOLIC BLOOD PRESSURE: 45 MMHG

## 2023-10-12 DIAGNOSIS — I95.1: Primary | ICD-10-CM

## 2023-10-12 DIAGNOSIS — R53.1: ICD-10-CM

## 2023-10-12 LAB
ADD MANUAL DIFF / SLIDE REVIEW: NO
ALBUMIN SERPL-MCNC: 4.3 G/DL (ref 3.5–5)
ALBUMIN/GLOB SERPL: 1.5 {RATIO} (ref 1–2.8)
ALP SERPL-CCNC: 56 U/L (ref 38–126)
ALT SERPL-CCNC: 14 IU/L (ref ?–50)
BUN SERPL-MCNC: 12 MG/DL (ref 9–20)
CALCIUM SERPL-MCNC: 9.5 MG/DL (ref 8.4–10.2)
CHLORIDE SERPL-SCNC: 103 MMOL/L (ref 98–107)
CO2 SERPL-SCNC: 22 MMOL/L (ref 22–32)
ESTIMATED GLOMERULAR FILT RATE: > 60 ML/MIN (ref 60–?)
GLOBULIN SER CALC-MCNC: 2.8 G/DL (ref 1.7–4.1)
GLUCOSE SERPL-MCNC: 106 MG/DL (ref 80–110)
HEMATOCRIT: 40.8 % (ref 41–53)
HEMOGLOBIN: 13.9 G/DL (ref 13.5–17.5)
HEMOLYSIS: 93 (ref 0–50)
LYMPHOCYTES # SPEC AUTO: 1600 /UL (ref 1100–4500)
MAGNESIUM SERPL-MCNC: 1.9 MG/DL (ref 1.6–2.3)
MCV RBC: 88.2 FL (ref 80–100)
MEAN CORPUSCULAR HEMOGLOBIN: 30 PG (ref 26–34)
MEAN CORPUSCULAR HGB CONC: 34 % (ref 30–36)
PHOSPHATE SERPL-MCNC: 3.6 MG/DL (ref 2.3–3.7)
PLATELET COUNT: 286 X10^3/UL (ref 150–400)
POTASSIUM SERPL-SCNC: 4.4 MMOL/L (ref 3.4–5.1)
PROT SERPL-MCNC: 7.1 G/DL (ref 6.3–8.2)
SODIUM SERPL-SCNC: 137 MMOL/L (ref 137–145)

## 2023-10-12 PROCEDURE — 70450 CT HEAD/BRAIN W/O DYE: CPT

## 2023-10-12 PROCEDURE — 84100 ASSAY OF PHOSPHORUS: CPT

## 2023-10-12 PROCEDURE — 71260 CT THORAX DX C+: CPT

## 2023-10-12 PROCEDURE — 71045 X-RAY EXAM CHEST 1 VIEW: CPT

## 2023-10-12 PROCEDURE — 83735 ASSAY OF MAGNESIUM: CPT

## 2023-10-12 PROCEDURE — 80053 COMPREHEN METABOLIC PANEL: CPT

## 2023-10-12 PROCEDURE — 85025 COMPLETE CBC W/AUTO DIFF WBC: CPT

## 2023-10-12 PROCEDURE — 93005 ELECTROCARDIOGRAM TRACING: CPT

## 2023-10-12 PROCEDURE — 99284 EMERGENCY DEPT VISIT MOD MDM: CPT

## 2024-08-19 ENCOUNTER — HOSPITAL ENCOUNTER (EMERGENCY)
Age: 74
Discharge: HOME | End: 2024-08-19
Payer: OTHER GOVERNMENT

## 2024-08-19 VITALS
SYSTOLIC BLOOD PRESSURE: 107 MMHG | OXYGEN SATURATION: 95 % | HEART RATE: 71 BPM | RESPIRATION RATE: 16 BRPM | DIASTOLIC BLOOD PRESSURE: 70 MMHG

## 2024-08-19 VITALS
SYSTOLIC BLOOD PRESSURE: 113 MMHG | OXYGEN SATURATION: 96 % | TEMPERATURE: 97.5 F | RESPIRATION RATE: 17 BRPM | DIASTOLIC BLOOD PRESSURE: 74 MMHG | HEART RATE: 73 BPM

## 2024-08-19 DIAGNOSIS — K59.00: Primary | ICD-10-CM

## 2024-08-19 PROCEDURE — 99282 EMERGENCY DEPT VISIT SF MDM: CPT

## 2024-08-19 PROCEDURE — 74018 RADEX ABDOMEN 1 VIEW: CPT

## 2024-08-19 PROCEDURE — 99283 EMERGENCY DEPT VISIT LOW MDM: CPT

## 2024-08-19 NOTE — ED_ITS
HPI - Recheck/Abnormal Lab/Rx    
General    
Chief Complaint: Recheck/Abnormal Lab/Rx    
Stated Complaint: constipation t-5    
Time Seen by Provider: 24 11:50    
Source: patient    
Mode of arrival: Ambulatory    
History of Present Illness    
HPI narrative:     
74-year-old male with history of constipation, using regimen of oral stool   
softeners over-the-counter, and suppository, felt increasing abdominal   
discomfort, felt that he was having trouble with constipation.  He has not   
recall recent problems with bowel obstruction, no surgical interventions   
recently.  No black or red stool.  No fevers or chills.  Fall, trauma new   
activities.  He denies pain with urination, or trouble with urinating, no   
frequency of urination.  He denies chest pain, cough, shortness of breath.    
Related Data    
                                Home Medications    
    
    
    
 Medication  Instructions  Recorded  Confirmed    
     
HOMEOPATHIC SUBSTANCE (MILK 1 cap PO QDAY ##0 12     
    
THISTLE)       
     
folic acid 800 mcg tablet 1 tabs PO QDAY ##0 12     
     
ibuprofen 800 mg tablet 800 mg PO Q8HP ##0 12     
     
lorazepam 1 mg tablet (Ativan) 1 mg PO Q8HP ##0 12     
     
albuterol sulfate 90 mcg/actuation 1 puff INH PRN ##0 12     
    
aerosol inhaler (Proventil HFA)       
     
telmisartan 40 mg tablet (Micardis) 40 mg PO QDAY ##0 12     
    
    
                                  Previous Rx's    
    
    
    
 Medication  Instructions  Recorded    
     
tamsulosin 0.4 mg capsule (Flomax) 0.4 mg PO BEDTIME #30 caps 23    
     
mupirocin 2 % topical ointment 1 applic topical BID #22 grams 23    
     
cephalexin 500 mg capsule 500 mg PO QID #20 caps 10/11/23    
     
lactulose 20 gram/30 mL oral 20 g (30 mL) PO BID #600 mL 24    
    
solution      
    
    
    
                                    Allergies    
    
    
    
Allergy/AdvReac Type Severity Reaction Status Date / Time    
     
bee venom protein (honey bee) Allergy   Verified 24 09:46    
    
    
    
    
Review of Systems    
Review of Systems    
Narrative:     
see HPI    
    
Patient History    
Medical History (Reviewed 10/11/23 @ 11:21 by Denilson Gandhi MD)    
    
Neuropathy    
    
    
Social History (Reviewed 10/11/23 @ 11:21 by Denilson Gandhi MD)    
household members:  spouse and family     
Smoking Status:  Former smoker     
    
    
Smoking Status: Former smoker    
tobacco type: cigarettes    
alcohol intake frequency: other    
Substance Use Type: does not use    
    
Exam    
Narrative    
Exam Narrative:     
GENERAL:  Well-developed patient, in mild distress.    
HEAD: Atraumatic. Normocephalic.     
EYES: Pupils equal round and reactive. Extraocular motions intact. No scleral   
icterus. No injection or drainage.     
ENT: Nose without bleeding, purulent drainage. Throat without erythema,   
tonsillar hypertrophy or exudate. Airway patent.    
NECK: Trachea midline. Non tender    
CARDIOVASCULAR: Regular rate and rhythm without murmurs, gallops, or rubs.     
RESPIRATORY: Clear to auscultation. Breath sounds equal bilaterally. No wheezes,  
rales, or rhonchi.      
GASTROINTESTINAL: Abdomen soft, non-tender, nondistended.     
EXTREMITIES: No edema or joint tenderness.     
BACK: Nontender without deformity or crepitance. No flank tenderness.    
NEURO: AOx3.  Grossly nonfocal motor functions    
SKIN: No rash or erythema of visible areas    
    
Initial Vital Signs    
Initial Vital Signs:     
    
Vital Signs    
    
    
    
Temperature  97.5 F L  24 09:43    
     
Pulse Rate  73   24 09:43    
     
Respiratory Rate  17   24 09:43    
     
Blood Pressure  113/74   24 09:43    
     
Pulse Oximetry  96   24 09:43    
     
Oxygen Delivery Method  Room Air  24 09:43    
    
    
    
    
    
Course    
Orders    
Ordered:     
    
                                    ED Orders    
    
24 11:27    
XR abdomen 1V Stat     
    
    
                                            
    
    
Discontinued Medications    
    
Lactulose (Lactulose 20 Gm/30 Ml Solution)  20 gm PO NOW ONE    
   Stop: 24 12:27    
   Last Admin: 24 13:04  Dose: 20 gm    
   Documented By: MLTY    
Sodium Biphosphate/Sodium Phosphate (Fleets Enema)  1 each MS NOW ONE    
   Stop: 24 12:27    
   Last Admin: 24 13:04 Dose:  Not Given    
   Documented By: LEILA    
    
    
    
Vital Signs    
Vital signs:     
    
                               Vital Signs - 8 hr    
    
    
    
 24    
13:10    
     
Pulse Rate 71    
     
Respiratory Rate 16    
     
Blood Pressure 107/70    
     
Pulse Oximetry 95    
     
Oxygen Delivery Method Room Air    
    
    
    
    
    
MDM - Recheck/Abnormal Lab/Rx    
Imaging Data    
Abdominal x-ray:     
      Radiologist's Impression:     
    
    
                                      Close    
Abdomen X-Ray (Signed)    
Yovanny Hercules    
-    
24    
    
    
    
    
    
    
                                  Launch?[address] [address]    
                                            
                                  XRay Report     
                                     Signed    
Patient: Diaz Lazaro    
    
    
    
    
    
    
    
MR#: [id number]    
    
    
: 1950    
    
    
    
    
    
    
    
Acct:[id number]    
    
    
Age/Sex: 74 / M    
    
    
    
    
    
    
    
Date of Service: 24    
    
    
Loc: ED    
    
    
    
    
    
    
    
    
Accession Number: [id number]       
Procedure: XR abdomen 1V    
Ordering Provider: Tobias Damian MD    
    
    
    
    
    
    
PROCEDURE:  XR ABDOMEN 1V    
     
INDICATIONS:  consipation issues    
     
TECHNIQUE:  One view of the abdomen acquired.      
     
COMPARISON:  None.    
     
FINDINGS:      
     
Surgical changes and devices:  Left femur surgical hardware.    
     
Bowel:  Bowel gas pattern is normal.  Moderate colonic stool load.      
     
Soft tissues:  No suspicious abdominal calcifications.  Visualized solid organ   
contours     
appear normal in size.      
     
Bones:  No suspicious bony lesions.      
     
     
IMPRESSION:     
     
No acute abnormality.    
     
Moderate colonic stool load.      
     
Dictated by: Yovanny Hercules M.D. on 2024 at 12:21         
Approved by: Yovanny Hercules M.D. on 2024 at 12:21     
[facility] Narrative    
Medical decision making narrative:     
74-year-old male complains ongoing constipation problems, refractory to his oral  
laxative over-the-counter regimen, and home use of enemas, feels that he needs   
further treatment evaluation.  Afebrile, sirs screen negative.  Abdomen benign   
exam.  He apparently had results here in the emergency department without any   
interventions, had numerous stooling, and feels better.  Post evacuation x-ray   
was performed, shows moderate stool burden, no obstructive pattern.  Advised   
continued use of his home regimen, seems to be working some.  We will add   
lactulose, sent to his pharmacy.  Discharged, stable, improved.  Return   
precautions discussed    
    
Discharge Plan    
Departure    
Patient Disposition: Home    
    
Clinical Impression:    
 Constipation    
    
    
Instructions:  DI for Constipation    
    
Activity Restrictions/Additional Instructions:    
Constipation problems, initially not responsive to your home regimen of oral   
laxatives and enemas.  While in the emergency department before any specific   
treatment in the emergency department you did have some stooling, and felt some   
improved symptoms.  After the stooling you did have x-ray that shows moderate   
stool burden in your colon.  No obstruction of the bowel pattern.  Continue use   
of your home laxative/enema regimen.  You could add prescription lactulose to   
help with stooling, but hold this if you start to have too much stooling and   
diarrhea.  Lactulose prescription sent to your pharmacy to use if needed.    
Recheck symptoms with your regular provider in the next couple of days.  Return   
to this/nearest emergency department for any change worsening symptoms or any   
concerns prior    
    
Prescriptions:    
New    
  lactulose 20 gram/30 mL solution     
   20 g PO BID Qty: 600 0RF    
    
No Action    
  folic acid 0.8 MG tablet     
   1 tabs PO QDAY Qty: 0     
  ibuprofen 800 MG tablet     
   800 mg PO Q8HP Qty: 0     
  HOMEOPATHIC SUBSTANCE (MILK THISTLE)       
   1 cap PO QDAY Qty: 0     
  lorazepam [Ativan] 1 MG tablet     
   1 mg PO Q8HP Qty: 0     
  telmisartan [Micardis] 40 MG tablet     
   40 mg PO QDAY Qty: 0     
  albuterol sulfate [Proventil HFA] 90 MCG/PUFF HFA aerosol inhaler     
   1 puff INH PRN Qty: 0     
  tamsulosin [Flomax] 0.4 mg capsule     
   0.4 mg PO BEDTIME Qty: 30 0RF    
  mupirocin 2 % ointment     
   1 applic topical BID Qty: 22 0RF    
  cephalexin 500 mg capsule     
   500 mg PO QID Qty: 20 0RF    
    
Referrals:    
Miscellaneous,Doctor, MD [Primary Care Provider] -     
    
Stand Alone Forms:  Patient Portal/API

## 2024-08-19 NOTE — DI.RAD.S_ITS
Dad calls and leaves a voicemail stating family is considering a vacation to the [place] or [place]. They are wondering if there are any health alerts or issues you would advise against, for their travel plans. (Sibling is also age 4 and UTD with immunizations.) Thank you!    Last WCE 9/19/18   PROCEDURE:  XR ABDOMEN 1V 
  
INDICATIONS:  consipation issues 
  
TECHNIQUE:  One view of the abdomen acquired.   
  
COMPARISON:  None. 
  
FINDINGS:   
  
Surgical changes and devices:  Left femur surgical hardware. 
  
Bowel:  Bowel gas pattern is normal.  Moderate colonic stool load.   
  
Soft tissues:  No suspicious abdominal calcifications.  Visualized solid organ contours  
appear normal in size.   
  
Bones:  No suspicious bony lesions.   
  
  
IMPRESSION:  
  
No acute abnormality. 
  
Moderate colonic stool load.   
  
Dictated by: Yovanny Hercules M.D. on 8/19/2024 at 12:21      
Approved by: Yovanny Hercules M.D. on 8/19/2024 at 12:21 no

## 2024-08-19 NOTE — ED.RECABL
HPI - Recheck/Abnormal Lab/Rx
General
Chief Complaint: Recheck/Abnormal Lab/Rx
Stated Complaint: constipation t-5
Time Seen by Provider: 24 11:50
Source: patient
Mode of arrival: Ambulatory
History of Present Illness
HPI narrative: 
74-year-old male with history of constipation, using regimen of oral stool softeners over-the-counter, and suppository, felt increasing abdominal discomfort, felt that he was having trouble with constipation.  He has not recall recent problems with 
bowel obstruction, no surgical interventions recently.  No black or red stool.  No fevers or chills.  Fall, trauma new activities.  He denies pain with urination, or trouble with urinating, no frequency of urination.  He denies chest pain, cough, 
shortness of breath.
Related Data
Home Medications

 Medication  Instructions  Recorded  Confirmed
HOMEOPATHIC SUBSTANCE (MILK 1 cap PO QDAY ##0 12 
THISTLE)   
folic acid 800 mcg tablet 1 tabs PO QDAY ##0 12 
ibuprofen 800 mg tablet 800 mg PO Q8HP ##0 12 
lorazepam 1 mg tablet (Ativan) 1 mg PO Q8HP ##0 12 
albuterol sulfate 90 mcg/actuation 1 puff INH PRN ##0 12 
aerosol inhaler (Proventil HFA)   
telmisartan 40 mg tablet (Micardis) 40 mg PO QDAY ##0 12 

Previous Rx's

 Medication  Instructions  Recorded
tamsulosin 0.4 mg capsule (Flomax) 0.4 mg PO BEDTIME #30 caps 23
mupirocin 2 % topical ointment 1 applic topical BID #22 grams 23
cephalexin 500 mg capsule 500 mg PO QID #20 caps 10/11/23
lactulose 20 gram/30 mL oral 20 g (30 mL) PO BID #600 mL 24
solution  


Allergies

Allergy/AdvReac Type Severity Reaction Status Date / Time
bee venom protein (honey bee) Allergy   Verified 24 09:46



Review of Systems
Review of Systems
Narrative: 
see HPI

Patient History
Medical History (Reviewed 10/11/23 @ 11:21 by Denilson Gandhi MD)

Neuropathy


Social History (Reviewed 10/11/23 @ 11:21 by Denilson Gandhi MD)
household members:  spouse and family 
Smoking Status:  Former smoker 


Smoking Status: Former smoker
tobacco type: cigarettes
alcohol intake frequency: other
Substance Use Type: does not use

Exam
Narrative
Exam Narrative: 
GENERAL:  Well-developed patient, in mild distress.
HEAD: Atraumatic. Normocephalic. 
EYES: Pupils equal round and reactive. Extraocular motions intact. No scleral icterus. No injection or drainage. 
ENT: Nose without bleeding, purulent drainage. Throat without erythema, tonsillar hypertrophy or exudate. Airway patent.
NECK: Trachea midline. Non tender
CARDIOVASCULAR: Regular rate and rhythm without murmurs, gallops, or rubs. 
RESPIRATORY: Clear to auscultation. Breath sounds equal bilaterally. No wheezes, rales, or rhonchi.  
GASTROINTESTINAL: Abdomen soft, non-tender, nondistended. 
EXTREMITIES: No edema or joint tenderness. 
BACK: Nontender without deformity or crepitance. No flank tenderness.
NEURO: AOx3.  Grossly nonfocal motor functions
SKIN: No rash or erythema of visible areas

Initial Vital Signs
Initial Vital Signs: 

Vital Signs

Temperature  97.5 F L  24 09:43
Pulse Rate  73   24 09:43
Respiratory Rate  17   24 09:43
Blood Pressure  113/74   24 09:43
Pulse Oximetry  96   24 09:43
Oxygen Delivery Method  Room Air  24 09:43




Course
Orders
Ordered: 

ED Orders

24 11:27
XR abdomen 1V Stat 





Discontinued Medications

Lactulose (Lactulose 20 Gm/30 Ml Solution)  20 gm PO NOW ONE
 Stop: 24 12:27
 Last Admin: 24 13:04  Dose: 20 gm
 Documented By: MLTY
Sodium Biphosphate/Sodium Phosphate (Fleets Enema)  1 each GA NOW ONE
 Stop: 24 12:27
 Last Admin: 24 13:04 Dose:  Not Given
 Documented By: LEILA



Vital Signs
Vital signs: 

Vital Signs - 8 hr

 24
13:10
Pulse Rate 71
Respiratory Rate 16
Blood Pressure 107/70
Pulse Oximetry 95
Oxygen Delivery Method Room Air




MDM - Recheck/Abnormal Lab/Rx
Imaging Data
Abdominal x-ray: 
      Radiologist's Impression: 


Close
Abdomen X-Ray (Signed)
Yovanny Hercules
-
24






Launch?[address] [address]

XRay Report 
Signed
Patient: Diaz Lazaro







MR#: [id number]


: 1950







Acct:[id number]


Age/Sex: 74 / M







Date of Service: 24


Loc: ED








Accession Number: [id number]   
Procedure: XR abdomen 1V
Ordering Provider: Tobias Damian MD






PROCEDURE:  XR ABDOMEN 1V
 
INDICATIONS:  consipation issues
 
TECHNIQUE:  One view of the abdomen acquired.  
 
COMPARISON:  None.
 
FINDINGS:  
 
Surgical changes and devices:  Left femur surgical hardware.
 
Bowel:  Bowel gas pattern is normal.  Moderate colonic stool load.  
 
Soft tissues:  No suspicious abdominal calcifications.  Visualized solid organ contours 
appear normal in size.  
 
Bones:  No suspicious bony lesions.  
 
 
IMPRESSION: 
 
No acute abnormality.
 
Moderate colonic stool load.  
 
Dictated by: Yovanny Hercules M.D. on 2024 at 12:21     
Approved by: Yovanny Hercules M.D. on 2024 at 12:21 
[facility] Narrative
Medical decision making narrative: 
74-year-old male complains ongoing constipation problems, refractory to his oral laxative over-the-counter regimen, and home use of enemas, feels that he needs further treatment evaluation.  Afebrile, sirs screen negative.  Abdomen benign exam.  He 
apparently had results here in the emergency department without any interventions, had numerous stooling, and feels better.  Post evacuation x-ray was performed, shows moderate stool burden, no obstructive pattern.  Advised continued use of his home 
regimen, seems to be working some.  We will add lactulose, sent to his pharmacy.  Discharged, stable, improved.  Return precautions discussed

Discharge Plan
Departure
Patient Disposition: Home

Clinical Impression:
 Constipation


Instructions:  DI for Constipation

Activity Restrictions/Additional Instructions:
Constipation problems, initially not responsive to your home regimen of oral laxatives and enemas.  While in the emergency department before any specific treatment in the emergency department you did have some stooling, and felt some improved 
symptoms.  After the stooling you did have x-ray that shows moderate stool burden in your colon.  No obstruction of the bowel pattern.  Continue use of your home laxative/enema regimen.  You could add prescription lactulose to help with stooling, 
but hold this if you start to have too much stooling and diarrhea.  Lactulose prescription sent to your pharmacy to use if needed.  Recheck symptoms with your regular provider in the next couple of days.  Return to this/nearest emergency department 
for any change worsening symptoms or any concerns prior

Prescriptions:
New
  lactulose 20 gram/30 mL solution 
   20 g PO BID Qty: 600 0RF

No Action
  folic acid 0.8 MG tablet 
   1 tabs PO QDAY Qty: 0 
  ibuprofen 800 MG tablet 
   800 mg PO Q8HP Qty: 0 
  HOMEOPATHIC SUBSTANCE (MILK THISTLE)   
   1 cap PO QDAY Qty: 0 
  lorazepam [Ativan] 1 MG tablet 
   1 mg PO Q8HP Qty: 0 
  telmisartan [Micardis] 40 MG tablet 
   40 mg PO QDAY Qty: 0 
  albuterol sulfate [Proventil HFA] 90 MCG/PUFF HFA aerosol inhaler 
   1 puff INH PRN Qty: 0 
  tamsulosin [Flomax] 0.4 mg capsule 
   0.4 mg PO BEDTIME Qty: 30 0RF
  mupirocin 2 % ointment 
   1 applic topical BID Qty: 22 0RF
  cephalexin 500 mg capsule 
   500 mg PO QID Qty: 20 0RF

Referrals:
Miscellaneous,Doctor, MD [Primary Care Provider] - 

Stand Alone Forms:  Patient Portal/API